# Patient Record
Sex: FEMALE | Race: WHITE | NOT HISPANIC OR LATINO | Employment: OTHER | URBAN - NONMETROPOLITAN AREA
[De-identification: names, ages, dates, MRNs, and addresses within clinical notes are randomized per-mention and may not be internally consistent; named-entity substitution may affect disease eponyms.]

---

## 2017-05-01 ENCOUNTER — OFFICE VISIT (OUTPATIENT)
Dept: CARDIOLOGY | Facility: CLINIC | Age: 72
End: 2017-05-01

## 2017-05-01 VITALS
HEART RATE: 76 BPM | HEIGHT: 64 IN | WEIGHT: 161 LBS | SYSTOLIC BLOOD PRESSURE: 120 MMHG | BODY MASS INDEX: 27.49 KG/M2 | DIASTOLIC BLOOD PRESSURE: 72 MMHG

## 2017-05-01 DIAGNOSIS — E78.00 HYPERCHOLESTEREMIA: ICD-10-CM

## 2017-05-01 DIAGNOSIS — R00.2 PALPITATIONS: ICD-10-CM

## 2017-05-01 DIAGNOSIS — G47.30 SLEEP APNEA IN ADULT: ICD-10-CM

## 2017-05-01 DIAGNOSIS — I10 ESSENTIAL HYPERTENSION: Primary | ICD-10-CM

## 2017-05-01 PROCEDURE — 99213 OFFICE O/P EST LOW 20 MIN: CPT | Performed by: NURSE PRACTITIONER

## 2017-05-01 RX ORDER — AMLODIPINE BESYLATE 2.5 MG/1
2.5 TABLET ORAL DAILY
COMMUNITY
End: 2017-05-01

## 2017-05-01 RX ORDER — CHOLECALCIFEROL (VITAMIN D3) 50 MCG
2000 TABLET ORAL DAILY
COMMUNITY

## 2017-11-02 ENCOUNTER — OFFICE VISIT (OUTPATIENT)
Dept: CARDIOLOGY | Facility: CLINIC | Age: 72
End: 2017-11-02

## 2017-11-02 VITALS
BODY MASS INDEX: 26.46 KG/M2 | HEART RATE: 72 BPM | SYSTOLIC BLOOD PRESSURE: 130 MMHG | DIASTOLIC BLOOD PRESSURE: 80 MMHG | WEIGHT: 155 LBS | HEIGHT: 64 IN

## 2017-11-02 DIAGNOSIS — E78.00 HYPERCHOLESTEREMIA: ICD-10-CM

## 2017-11-02 DIAGNOSIS — I10 ESSENTIAL HYPERTENSION: Primary | ICD-10-CM

## 2017-11-02 DIAGNOSIS — R00.2 PALPITATIONS: ICD-10-CM

## 2017-11-02 PROCEDURE — 99213 OFFICE O/P EST LOW 20 MIN: CPT | Performed by: NURSE PRACTITIONER

## 2017-11-02 NOTE — PROGRESS NOTES
Chief Complaint   Patient presents with   • Follow-up     For cardiac management. Last lab work on 05/02/17 per PCP, in chart.    • Med Refill     PCP does medication refills.        Cardiac Complaints  none      Subjective   Sheryl Bryan is a 72 y.o. female  history of normal coronaries by cardiac catheterization at  about 9 years ago.  At that time she underwent an esophageal surgery, and developed heart failure with normal systolic function.  Cardiac catheterization was reported as normal according to the patient.  She has since been treated with a combination of beta blockers and ARBs. At her last office visit recommended increasing beta blocker and stopping ARB.  She returns today for follow up and denies any cardiac concerns.  She denies chest pain, shortness of breath, or palpitations.  Patient states her coreg has kept palpitations well controlled.  Labs she reports with PCP and states most recent were done in May, she reports everything looked okay.  No refills needed as they are done with PCP.      Cardiac History  Past Surgical History:   Procedure Laterality Date   • CARDIOVASCULAR STRESS TEST  03/31/2015    4 Min, 82% THR. BP-- 180/80. Negative   • ECHO - CONVERTED  12/23/2014    ([facility]) EF 65%. RVSP 48 mmHg       Current Outpatient Prescriptions   Medication Sig Dispense Refill   • carvedilol (COREG) 12.5 MG tablet Take 1 tablet by mouth 2 (Two) Times a Day. 180 tablet 3   • Cholecalciferol (VITAMIN D) 2000 UNITS tablet Take 2,000 Units by mouth 2 (Two) Times a Day.     • doxepin (SINEquan) 75 MG capsule Take 75 mg by mouth Every Night.     • furosemide (LASIX) 40 MG tablet Daily.     • pantoprazole (PROTONIX) 40 MG EC tablet Take 40 mg by mouth Daily.     • PARoxetine (PAXIL) 20 MG tablet Take 20 mg by mouth Every Morning.     • potassium chloride (K-DUR,KLOR-CON) 20 MEQ CR tablet Take 20 mEq by mouth Daily.     • tiZANidine (ZANAFLEX) 4 MG tablet Take 4 mg by mouth At Night As Needed for muscle  "spasms.       No current facility-administered medications for this visit.        Penicillins    Past Medical History:   Diagnosis Date   • GERD (gastroesophageal reflux disease)    • H/O colonoscopy    • History of appendectomy    • Hypertension    • Sleep apnea        Social History     Social History   • Marital status:      Spouse name: N/A   • Number of children: N/A   • Years of education: N/A     Occupational History   • Not on file.     Social History Main Topics   • Smoking status: Former Smoker     Quit date: 1980   • Smokeless tobacco: Never Used   • Alcohol use Yes      Comment: socially- occasional   • Drug use: No   • Sexual activity: Not on file     Other Topics Concern   • Not on file     Social History Narrative       Family History   Problem Relation Age of Onset   • Cancer Mother      leukemia   • Aneurysm Father        Review of Systems   Constitution: Negative for weakness and malaise/fatigue.   Cardiovascular: Negative for chest pain, dyspnea on exertion, near-syncope and syncope.   Respiratory: Negative for shortness of breath and wheezing.    Musculoskeletal: Negative for arthritis and back pain.   Gastrointestinal: Negative for anorexia, heartburn and nausea.   Neurological: Negative for dizziness, light-headedness and loss of balance.   Psychiatric/Behavioral: Negative for altered mental status and depression.       DiabetesNo  Thyroidnormal    Objective     /80 (BP Location: Left arm)  Pulse 72  Ht 64\" (162.6 cm)  Wt 155 lb (70.3 kg)  BMI 26.61 kg/m2    Physical Exam   Constitutional: She is oriented to person, place, and time. She appears well-developed and well-nourished.   HENT:   Head: Normocephalic and atraumatic.   Eyes: EOM are normal. Pupils are equal, round, and reactive to light.   Neck: Normal range of motion. Neck supple.   Cardiovascular: Normal rate and regular rhythm.    Pulmonary/Chest: Effort normal and breath sounds normal.   Abdominal: Soft. "   Musculoskeletal: Normal range of motion.   Neurological: She is alert and oriented to person, place, and time.   Skin: Skin is warm and dry.   Psychiatric: She has a normal mood and affect. Her behavior is normal.       Procedures    Assessment/Plan     HR and BP are both stable today.  No changes in current regimen will be advised.  She has done much better with medication changes with stopping ARB and increasing BB.  Labs are done with your office, could I have most recent copy for our records?  No refills are needed today as she reports they are done with your office as well.  No new cardiac testing advised today as no new concerns are voiced.  We will consider repeat workup at next visit since length of time since last testing and past history of  LV dysfunction.  Good cardiac diet and activity as tolerated advised.  She has lost about 6 pounds since last visit trying to watch her diet and exercising.  6 month follow up advised with patient advised to call with concerns for sooner appointment.        Problems Addressed this Visit        Cardiovascular and Mediastinum    Essential hypertension - Primary    Hypercholesteremia    Palpitations                  Electronically signed by CHRISS Arce November 2, 2017 4:53 PM

## 2019-03-11 ENCOUNTER — TELEPHONE (OUTPATIENT)
Dept: CARDIOLOGY | Facility: CLINIC | Age: 74
End: 2019-03-11

## 2019-03-11 NOTE — TELEPHONE ENCOUNTER
Received fax from Dr. Eugene for cardiac clearance for patient to have an excision of right buttock cyst. Patient is on aspirin. According to our records, I do not see where patient has had any stenting. Patient was last seen on 11/02/17.      Fax [phone]  Phone [phone]

## 2019-09-09 ENCOUNTER — OFFICE VISIT (OUTPATIENT)
Dept: CARDIOLOGY | Facility: CLINIC | Age: 74
End: 2019-09-09

## 2019-09-09 VITALS
DIASTOLIC BLOOD PRESSURE: 84 MMHG | HEIGHT: 64 IN | SYSTOLIC BLOOD PRESSURE: 132 MMHG | WEIGHT: 156 LBS | HEART RATE: 75 BPM | BODY MASS INDEX: 26.63 KG/M2

## 2019-09-09 DIAGNOSIS — R00.2 PALPITATIONS: ICD-10-CM

## 2019-09-09 DIAGNOSIS — I10 ESSENTIAL HYPERTENSION: ICD-10-CM

## 2019-09-09 DIAGNOSIS — M25.561 CHRONIC PAIN OF RIGHT KNEE: ICD-10-CM

## 2019-09-09 DIAGNOSIS — E66.3 OVERWEIGHT: ICD-10-CM

## 2019-09-09 DIAGNOSIS — R01.1 HEART MURMUR: Primary | ICD-10-CM

## 2019-09-09 DIAGNOSIS — G47.33 OBSTRUCTIVE SLEEP APNEA SYNDROME: ICD-10-CM

## 2019-09-09 DIAGNOSIS — G89.29 CHRONIC PAIN OF RIGHT KNEE: ICD-10-CM

## 2019-09-09 DIAGNOSIS — R94.31 ABNORMAL EKG: ICD-10-CM

## 2019-09-09 DIAGNOSIS — I27.20 PULMONARY HTN (HCC): ICD-10-CM

## 2019-09-09 DIAGNOSIS — E78.00 HYPERCHOLESTEREMIA: ICD-10-CM

## 2019-09-09 PROCEDURE — 93000 ELECTROCARDIOGRAM COMPLETE: CPT | Performed by: NURSE PRACTITIONER

## 2019-09-09 PROCEDURE — 99214 OFFICE O/P EST MOD 30 MIN: CPT | Performed by: NURSE PRACTITIONER

## 2019-09-09 RX ORDER — ASPIRIN 81 MG/1
81 TABLET ORAL
COMMUNITY

## 2019-09-09 NOTE — PROGRESS NOTES
Chief Complaint   Patient presents with   • Follow-up     For cardiac management. Patient takes aspirin every 3 days. Patient states that she will be having knee replacement surgery. Last lab work was done on 06/26/19 per PCP, in chart under media with scan on 07/02/19 of office note from PCP. EKG done due to not having one in over 3 years and she states HR sometimes is in the 90s.    • Med Refill     PCP does medication refills. Brought medication list.        Cardiac Complaints  palpitations      Subjective   Sheryl LAURENT Bryan is a 74 y.o. female with normal coronaries by cardiac catheterization at , HTN, hyperlipidemia, palpitations,  pulmonary HTN, and sleep apnea.  At that time she underwent an esophageal surgery, and developed heart failure with normal systolic function.  Cardiac catheterization was reported as normal according to the patient.  She has since been treated with a combination of beta blockers and ARBs. At her last office visit recommended increasing beta blocker and stopping ARB.      She returns today for follow up and denies any cardiac concerns.  She denies chest pain, shortness of breath, or palpitations.  Patient states her coreg has kept palpitations well controlled. She denies wearing CPAP for sleep apnea management. Labs she reports with PCP and states most recent were done in May, she reports everything looked okay.  No refills needed as they are done with PCP.  Most recent labs from June 2019 show:  H/H 14.1/40.7, TRIG 184, HDL 49, and .  No refills needed as she reports with PCP.  She does admit to upcoming knee surgery with Dr. Rowan that is scheduled for sometime after the first of the year, she is requesting clearance in regards.            Cardiac History  Past Surgical History:   Procedure Laterality Date   • CARDIOVASCULAR STRESS TEST  03/31/2015    4 Min, 82% THR. BP-- 180/80. Negative   • ECHO - CONVERTED  12/23/2014    ([facility]) EF 65%. RVSP 48 mmHg       Current  Outpatient Medications   Medication Sig Dispense Refill   • aspirin 81 MG EC tablet Take 81 mg by mouth Every 3 (Three) Days.     • carvedilol (COREG) 12.5 MG tablet Take 1 tablet by mouth 2 (Two) Times a Day. 180 tablet 3   • Cholecalciferol (VITAMIN D) 2000 UNITS tablet Take 2,000 Units by mouth Daily.     • doxepin (SINEquan) 75 MG capsule Take 75 mg by mouth Every Night.     • furosemide (LASIX) 40 MG tablet Take 40 mg by mouth Daily.     • pantoprazole (PROTONIX) 40 MG EC tablet Take 40 mg by mouth Daily.     • PARoxetine (PAXIL) 20 MG tablet Take 20 mg by mouth Every Morning.     • potassium chloride (K-DUR,KLOR-CON) 20 MEQ CR tablet Take 20 mEq by mouth Daily.     • tiZANidine (ZANAFLEX) 4 MG tablet Take 4 mg by mouth At Night As Needed for muscle spasms.       No current facility-administered medications for this visit.        Penicillins    Past Medical History:   Diagnosis Date   • GERD (gastroesophageal reflux disease)    • H/O colonoscopy    • History of appendectomy    • Hypertension    • Sleep apnea        Social History     Socioeconomic History   • Marital status:      Spouse name: Not on file   • Number of children: Not on file   • Years of education: Not on file   • Highest education level: Not on file   Tobacco Use   • Smoking status: Former Smoker     Last attempt to quit: 1980     Years since quittin.7   • Smokeless tobacco: Never Used   Substance and Sexual Activity   • Alcohol use: Yes     Comment: socially- occasional   • Drug use: No       Family History   Problem Relation Age of Onset   • Cancer Mother         leukemia   • Aneurysm Father        Review of Systems   Constitution: Negative for weakness and malaise/fatigue.   Cardiovascular: Positive for palpitations. Negative for chest pain, claudication, dyspnea on exertion, irregular heartbeat, leg swelling, near-syncope and syncope.   Respiratory: Negative for cough, shortness of breath and wheezing.    Musculoskeletal:  "Positive for joint pain and stiffness.   Gastrointestinal: Negative for anorexia, flatus, melena, nausea and vomiting.   Genitourinary: Negative for dysuria, hematuria, hesitancy and nocturia.   Neurological: Negative for dizziness, light-headedness and loss of balance.   Psychiatric/Behavioral: Negative for depression and memory loss. The patient is not nervous/anxious.            Objective     /84 (BP Location: Left arm)   Pulse 75   Ht 162.6 cm (64.02\")   Wt 70.8 kg (156 lb)   BMI 26.76 kg/m²     Physical Exam   Constitutional: She is oriented to person, place, and time. She appears well-developed and well-nourished.   HENT:   Head: Normocephalic and atraumatic.   Eyes: EOM are normal. Pupils are equal, round, and reactive to light.   Neck: Normal range of motion. Neck supple.   Cardiovascular: Normal rate and regular rhythm.   Murmur heard.  Pulmonary/Chest: Effort normal and breath sounds normal.   Abdominal: Soft.   Musculoskeletal: Normal range of motion.   Neurological: She is alert and oriented to person, place, and time.   Skin: Skin is warm and dry.   Psychiatric: She has a normal mood and affect. Her behavior is normal.         ECG 12 Lead  Date/Time: 9/9/2019 10:40 AM  Performed by: Yoanna Shukla APRN  Authorized by: Yoanna Shukla APRN   Comparison: compared with previous ECG from 3/24/2015  Similar to previous ECG  Rhythm: sinus rhythm  BPM: 75    Clinical impression: abnormal EKG            Assessment/Plan     HR is stable today and regular rhythm noted. HTN well managed on current, same continued. EKG done today for history of palpitations shows a NSR and possible old infarct similar to prior EKG in 2015.  Echo will be advised today to assess for any worsening pulmonary HTN, WMA, or LV dysfunction as she does have history of pulmonary HTN and LV dysfunction in the past. More recommendations to follow. She denies compliance with current CPAP therapy for sleep apnea. Patient urged to " see pulmonary in regards. Palpitations remain well controlled on current beta blocker therapy, same advised. Limited caffeine intake recommended. She does have knee pain and admits she will be having upcoming knee surgery. If clearance should be needed, form to be filled out. Most recent labs show LDL elevated over 100, she does not wish to start statin therapy at this time. She will be urged to limit carbs, sugars, and starches and to walk more once her knee is repaired as well as add krill oil OTC in regards. BMI noted at 26.67 similar to prior. Patient advised on good cardiac diet with limited carbs, calories, and increased activity.  6 month follow up recommended or sooner if needed.         Problems Addressed this Visit        Cardiovascular and Mediastinum    Essential hypertension    Hypercholesteremia    Palpitations    Relevant Orders    ECG 12 Lead      Other Visit Diagnoses     Heart murmur    -  Primary    Relevant Orders    Adult Transthoracic Echo Complete W/ Cont if Necessary Per Protocol    Pulmonary HTN (CMS/HCC)        Relevant Orders    Adult Transthoracic Echo Complete W/ Cont if Necessary Per Protocol    Abnormal EKG        Relevant Orders    Adult Transthoracic Echo Complete W/ Cont if Necessary Per Protocol    Chronic pain of right knee        Obstructive sleep apnea syndrome        Overweight              Patient's Body mass index is 26.76 kg/m². BMI is above normal parameters. Recommendations include: nutrition counseling.                  Electronically signed by CHRISS Arce September 9, 2019 4:23 PM

## 2019-09-17 ENCOUNTER — APPOINTMENT (OUTPATIENT)
Dept: CARDIOLOGY | Facility: HOSPITAL | Age: 74
End: 2019-09-17

## 2019-09-25 ENCOUNTER — APPOINTMENT (OUTPATIENT)
Dept: CARDIOLOGY | Facility: HOSPITAL | Age: 74
End: 2019-09-25

## 2020-01-14 ENCOUNTER — HOSPITAL ENCOUNTER (OUTPATIENT)
Dept: CARDIOLOGY | Facility: HOSPITAL | Age: 75
Discharge: HOME | End: 2020-01-14
Admitting: NURSE PRACTITIONER

## 2020-01-14 VITALS — BODY MASS INDEX: 26.65 KG/M2 | WEIGHT: 156.09 LBS | HEIGHT: 64 IN

## 2020-01-14 DIAGNOSIS — R94.31 ABNORMAL EKG: ICD-10-CM

## 2020-01-14 DIAGNOSIS — I27.20 PULMONARY HTN (HCC): ICD-10-CM

## 2020-01-14 DIAGNOSIS — R01.1 HEART MURMUR: ICD-10-CM

## 2020-01-14 LAB
AORTIC DIMENSIONLESS INDEX: 1 (DI)
BH CV ECHO MEAS - ACS: 1.6 CM
BH CV ECHO MEAS - AI DEC SLOPE: 171 CM/SEC^2
BH CV ECHO MEAS - AI MAX PG: 26.2 MMHG
BH CV ECHO MEAS - AI MAX VEL: 256 CM/SEC
BH CV ECHO MEAS - AI P1/2T: 438.5 MSEC
BH CV ECHO MEAS - AO MAX PG (FULL): -0.29 MMHG
BH CV ECHO MEAS - AO MAX PG: 5.6 MMHG
BH CV ECHO MEAS - AO MEAN PG (FULL): -1 MMHG
BH CV ECHO MEAS - AO MEAN PG: 2 MMHG
BH CV ECHO MEAS - AO ROOT AREA (BSA CORRECTED): 1.6
BH CV ECHO MEAS - AO ROOT AREA: 6.2 CM^2
BH CV ECHO MEAS - AO ROOT DIAM: 2.8 CM
BH CV ECHO MEAS - AO V2 MAX: 118 CM/SEC
BH CV ECHO MEAS - AO V2 MEAN: 70.8 CM/SEC
BH CV ECHO MEAS - AO V2 VTI: 25.7 CM
BH CV ECHO MEAS - BSA(HAYCOCK): 1.8 M^2
BH CV ECHO MEAS - BSA: 1.8 M^2
BH CV ECHO MEAS - BZI_BMI: 26.8 KILOGRAMS/M^2
BH CV ECHO MEAS - BZI_METRIC_HEIGHT: 162.6 CM
BH CV ECHO MEAS - BZI_METRIC_WEIGHT: 70.8 KG
BH CV ECHO MEAS - EDV(CUBED): 97.3 ML
BH CV ECHO MEAS - EDV(TEICH): 97.3 ML
BH CV ECHO MEAS - EF(CUBED): 78.9 %
BH CV ECHO MEAS - EF(TEICH): 71.2 %
BH CV ECHO MEAS - ESV(CUBED): 20.6 ML
BH CV ECHO MEAS - ESV(TEICH): 28 ML
BH CV ECHO MEAS - FS: 40.4 %
BH CV ECHO MEAS - IVS/LVPW: 1.2
BH CV ECHO MEAS - IVSD: 1.2 CM
BH CV ECHO MEAS - LA DIMENSION(2D): 3.7 CM
BH CV ECHO MEAS - LA DIMENSION: 4.1 CM
BH CV ECHO MEAS - LA/AO: 1.5
BH CV ECHO MEAS - LAT PEAK E' VEL: 6.5 CM/SEC
BH CV ECHO MEAS - LV IVRT: 0.11 SEC
BH CV ECHO MEAS - LV MASS(C)D: 173.2 GRAMS
BH CV ECHO MEAS - LV MASS(C)DI: 98.4 GRAMS/M^2
BH CV ECHO MEAS - LV MAX PG: 5.9 MMHG
BH CV ECHO MEAS - LV MEAN PG: 3 MMHG
BH CV ECHO MEAS - LV V1 MAX: 121 CM/SEC
BH CV ECHO MEAS - LV V1 MEAN: 73.1 CM/SEC
BH CV ECHO MEAS - LV V1 VTI: 29.6 CM
BH CV ECHO MEAS - LVIDD: 4.6 CM
BH CV ECHO MEAS - LVIDS: 2.7 CM
BH CV ECHO MEAS - LVPWD: 0.98 CM
BH CV ECHO MEAS - MED PEAK E' VEL: 5.7 CM/SEC
BH CV ECHO MEAS - MR MAX PG: 48.2 MMHG
BH CV ECHO MEAS - MR MAX VEL: 347 CM/SEC
BH CV ECHO MEAS - MV A MAX VEL: 122 CM/SEC
BH CV ECHO MEAS - MV DEC SLOPE: 501 CM/SEC^2
BH CV ECHO MEAS - MV DEC TIME: 0.29 SEC
BH CV ECHO MEAS - MV E MAX VEL: 93.9 CM/SEC
BH CV ECHO MEAS - MV E/A: 0.77
BH CV ECHO MEAS - MV MAX PG: 6.9 MMHG
BH CV ECHO MEAS - MV MEAN PG: 3 MMHG
BH CV ECHO MEAS - MV P1/2T MAX VEL: 89.8 CM/SEC
BH CV ECHO MEAS - MV P1/2T: 52.5 MSEC
BH CV ECHO MEAS - MV V2 MAX: 131 CM/SEC
BH CV ECHO MEAS - MV V2 MEAN: 74.2 CM/SEC
BH CV ECHO MEAS - MV V2 VTI: 31.1 CM
BH CV ECHO MEAS - MVA P1/2T LCG: 2.4 CM^2
BH CV ECHO MEAS - MVA(P1/2T): 4.2 CM^2
BH CV ECHO MEAS - PA MAX PG (FULL): 1.7 MMHG
BH CV ECHO MEAS - PA MAX PG: 4 MMHG
BH CV ECHO MEAS - PA MEAN PG (FULL): 1 MMHG
BH CV ECHO MEAS - PA MEAN PG: 2 MMHG
BH CV ECHO MEAS - PA V2 MAX: 100 CM/SEC
BH CV ECHO MEAS - PA V2 MEAN: 68.5 CM/SEC
BH CV ECHO MEAS - PA V2 VTI: 19.4 CM
BH CV ECHO MEAS - RAP SYSTOLE: 10 MMHG
BH CV ECHO MEAS - RV MAX PG: 2.3 MMHG
BH CV ECHO MEAS - RV MEAN PG: 1 MMHG
BH CV ECHO MEAS - RV V1 MAX: 75.7 CM/SEC
BH CV ECHO MEAS - RV V1 MEAN: 53.9 CM/SEC
BH CV ECHO MEAS - RV V1 VTI: 17.5 CM
BH CV ECHO MEAS - RVDD: 2.6 CM
BH CV ECHO MEAS - RVSP: 30 MMHG
BH CV ECHO MEAS - SI(AO): 89.9 ML/M^2
BH CV ECHO MEAS - SI(CUBED): 43.6 ML/M^2
BH CV ECHO MEAS - SI(TEICH): 39.4 ML/M^2
BH CV ECHO MEAS - SV(AO): 158.2 ML
BH CV ECHO MEAS - SV(CUBED): 76.8 ML
BH CV ECHO MEAS - SV(TEICH): 69.3 ML
BH CV ECHO MEAS - TR MAX VEL: 223 CM/SEC
BH CV ECHO MEASUREMENTS AVERAGE E/E' RATIO: 15.39
MAXIMAL PREDICTED HEART RATE: 145 BPM
STRESS TARGET HR: 123 BPM

## 2020-01-14 PROCEDURE — 93306 TTE W/DOPPLER COMPLETE: CPT

## 2020-01-14 PROCEDURE — 93306 TTE W/DOPPLER COMPLETE: CPT | Performed by: INTERNAL MEDICINE

## 2020-01-15 ENCOUNTER — TELEPHONE (OUTPATIENT)
Dept: CARDIOLOGY | Facility: CLINIC | Age: 75
End: 2020-01-15

## 2020-01-15 NOTE — TELEPHONE ENCOUNTER
Received fax from Dr. Ward for cardiac clearance for patient to have a gastrocnemius recession surgery. Patient is on aspirin. According to our records, I do not see where patient has had any stenting.       Fax [phone]    Attn: Maegan Carmona

## 2021-02-01 ENCOUNTER — IMMUNIZATION (OUTPATIENT)
Dept: VACCINE CLINIC | Facility: HOSPITAL | Age: 76
End: 2021-02-01

## 2021-02-01 PROCEDURE — 0001A: CPT | Performed by: FAMILY MEDICINE

## 2021-02-01 PROCEDURE — 91300 HC SARSCOV02 VAC 30MCG/0.3ML IM: CPT | Performed by: FAMILY MEDICINE

## 2021-02-22 ENCOUNTER — IMMUNIZATION (OUTPATIENT)
Dept: VACCINE CLINIC | Facility: HOSPITAL | Age: 76
End: 2021-02-22

## 2021-02-22 PROCEDURE — 0002A: CPT | Performed by: INTERNAL MEDICINE

## 2021-02-22 PROCEDURE — 91300 HC SARSCOV02 VAC 30MCG/0.3ML IM: CPT | Performed by: INTERNAL MEDICINE

## 2023-08-15 ENCOUNTER — OFFICE VISIT (OUTPATIENT)
Dept: CARDIOLOGY | Facility: CLINIC | Age: 78
End: 2023-08-15
Payer: MEDICARE

## 2023-08-15 VITALS
HEART RATE: 72 BPM | WEIGHT: 154 LBS | BODY MASS INDEX: 26.29 KG/M2 | DIASTOLIC BLOOD PRESSURE: 80 MMHG | SYSTOLIC BLOOD PRESSURE: 156 MMHG | HEIGHT: 64 IN

## 2023-08-15 DIAGNOSIS — R00.2 PALPITATIONS: ICD-10-CM

## 2023-08-15 DIAGNOSIS — I44.7 LBBB (LEFT BUNDLE BRANCH BLOCK): ICD-10-CM

## 2023-08-15 DIAGNOSIS — G47.30 SLEEP APNEA IN ADULT: ICD-10-CM

## 2023-08-15 DIAGNOSIS — I10 ESSENTIAL HYPERTENSION: ICD-10-CM

## 2023-08-15 DIAGNOSIS — R42 DIZZINESS: ICD-10-CM

## 2023-08-15 DIAGNOSIS — I25.6 SILENT MYOCARDIAL ISCHEMIA: ICD-10-CM

## 2023-08-15 DIAGNOSIS — R73.9 HYPERGLYCEMIA: ICD-10-CM

## 2023-08-15 DIAGNOSIS — R06.02 SHORTNESS OF BREATH: Primary | ICD-10-CM

## 2023-08-15 DIAGNOSIS — R53.82 CHRONIC FATIGUE: ICD-10-CM

## 2023-08-15 DIAGNOSIS — E78.00 HYPERCHOLESTEREMIA: ICD-10-CM

## 2023-08-15 DIAGNOSIS — R01.1 HEART MURMUR: ICD-10-CM

## 2023-08-15 DIAGNOSIS — R20.0 NUMBNESS: ICD-10-CM

## 2023-08-15 PROCEDURE — 3077F SYST BP >= 140 MM HG: CPT | Performed by: INTERNAL MEDICINE

## 2023-08-15 PROCEDURE — 1160F RVW MEDS BY RX/DR IN RCRD: CPT | Performed by: INTERNAL MEDICINE

## 2023-08-15 PROCEDURE — 93000 ELECTROCARDIOGRAM COMPLETE: CPT | Performed by: INTERNAL MEDICINE

## 2023-08-15 PROCEDURE — 1159F MED LIST DOCD IN RCRD: CPT | Performed by: INTERNAL MEDICINE

## 2023-08-15 PROCEDURE — 3079F DIAST BP 80-89 MM HG: CPT | Performed by: INTERNAL MEDICINE

## 2023-08-15 PROCEDURE — 99204 OFFICE O/P NEW MOD 45 MIN: CPT | Performed by: INTERNAL MEDICINE

## 2023-08-15 RX ORDER — CARVEDILOL 12.5 MG/1
12.5 TABLET ORAL 2 TIMES DAILY WITH MEALS
COMMUNITY

## 2023-08-15 RX ORDER — MONTELUKAST SODIUM 10 MG/1
10 TABLET ORAL NIGHTLY
COMMUNITY

## 2023-08-15 RX ORDER — NAPROXEN SODIUM 220 MG
220 TABLET ORAL
COMMUNITY

## 2023-08-15 RX ORDER — CHOLECALCIFEROL (VITAMIN D3) 125 MCG
500 CAPSULE ORAL DAILY
COMMUNITY

## 2023-08-15 RX ORDER — LOSARTAN POTASSIUM 50 MG/1
50 TABLET ORAL DAILY
Qty: 30 TABLET | Refills: 6 | Status: SHIPPED | OUTPATIENT
Start: 2023-08-15

## 2023-08-15 NOTE — LETTER
August 15, 2023       No Recipients    Patient: Sheryl Bryan   YOB: 1945   Date of Visit: 8/15/2023     Dear Rodney Swift MD:       Thank you for referring Sheryl Bryan to me for evaluation. Below are the relevant portions of my assessment and plan of care.    If you have questions, please do not hesitate to call me. I look forward to following Sheryl along with you.         Sincerely,        Jamison Judge MD        CC:   No Recipients    Jamison Judge MD  08/15/23 1333  Sign when Signing Visit  Chief Complaint   Patient presents with    Establish Care     Self referred for shortness of breath and swelling. Last saw us in 2019, has not had any cardiac testing since then, everything is in chart. Last testing was an echo in 2020, in chart under cardiology. Did have some labs done with orthopaedics, results are in chart under labs from 06/15/23. Will attempt to obtain labs from PCP.     Shortness of Breath     States that shortness of breath has been happening for about a year with exertion. Takes about 5 minutes for her to get breath again.    Leg Swelling     States that she has noticed swelling off and on. States that she did have to have reconstructive surgery on left ankle, but has noticed swelling in both ankles. States that when she notices that they are swollen she will take an extra lasix.    Numbness     States that she does have some numbness in tingling in her arms at times.     Sleep apnea     Does sleep with CPAP and it has O2 at night.        CARDIAC COMPLAINTS  dyspnea, fatigue, and lower extremity edema      Subjective  Sheryl Bryan is a 78 y.o. female came in today as a self-referral.  She was last seen here more than 3 years ago.  She has history of hypertension was initially seen for shortness of breath.  She was diagnosed with congestive heart failure in the past.  She underwent esophageal surgery at  and went into pulmonary congestion and found to have a heart failure  with normal systolic function.  She underwent cardiac catheterization at  and according to her it was normal.  She was then seen by another cardiologist and later with us.  Her echocardiogram which was done in 2019 showed LVH with good LV function mild left atrial enlargement mild MR and AI and no significant pulmonary hypertension.  Her EKG at that time was unremarkable.  She also has dyslipidemia but was not on any statin at that time.  She was scheduled to undergo surgery on knee as well as leg.  Cardiac clearance was given.  She now wants to be reevaluated.  For about a year she has noticed increasing shortness of breath.  It occurs mostly on exertion and when it starts she has to take at least 5 minutes of rest before she feels better.  She also noticed that she has bilateral leg swelling mostly around the ankles.  She take extra dose of Lasix.  She also started noticing tightness in the chest and also numbness in the arm.  The tightness occurs when she is very short winded.  She was diagnosed with sleep apnea and uses CPAP and now has oxygen also along with the CPAP.  She is not sure when was the last time she had labs done.  She quit smoking in .  Her mother  at the age of 89 with cancer and her father  at the age of 69 with aneurysm.    Past Surgical History:   Procedure Laterality Date    CARDIOVASCULAR STRESS TEST  2015    4 Min, 82% THR. BP-- 180/80. Negative    ECHO - CONVERTED  2014    ([facility]) EF 65%. RVSP 48 mmHg    ECHO - CONVERTED  2020    EF 65%. LA- 4.1 Cm. Mild MR & AI. RVSP- 30 mmHg.       Current Outpatient Medications   Medication Sig Dispense Refill    carvedilol (COREG) 12.5 MG tablet Take 1 tablet by mouth 2 (Two) Times a Day With Meals.      Cholecalciferol (VITAMIN D) 2000 UNITS tablet Take 1 tablet by mouth Daily.      doxepin (SINEquan) 75 MG capsule Take 1 capsule by mouth Every Night.      furosemide (LASIX) 40 MG tablet Take 1 tablet by mouth 2  (Two) Times a Day As Needed.      montelukast (SINGULAIR) 10 MG tablet Take 1 tablet by mouth Every Night.      naproxen sodium (ALEVE) 220 MG tablet Take 1 tablet by mouth every night at bedtime.      pantoprazole (PROTONIX) 40 MG EC tablet Take 1 tablet by mouth Daily.      potassium chloride (K-DUR,KLOR-CON) 20 MEQ CR tablet Take 1 tablet by mouth Daily.      Probiotic Product (PROBIOTIC DAILY PO) Take 100 mg by mouth 2 (Two) Times a Day.      tiZANidine (ZANAFLEX) 4 MG tablet Take 1 tablet by mouth At Night As Needed for Muscle Spasms.      vitamin B-12 (CYANOCOBALAMIN) 500 MCG tablet Take 1 tablet by mouth Daily.      aspirin 81 MG EC tablet Take 1 tablet by mouth Every 3 (Three) Days.      losartan (Cozaar) 50 MG tablet Take 1 tablet by mouth Daily. 30 tablet 6    PARoxetine (PAXIL) 20 MG tablet Take 1 tablet by mouth Every Morning.       No current facility-administered medications for this visit.           ALLERGIES:  Penicillins    Past Medical History:   Diagnosis Date    GERD (gastroesophageal reflux disease)     H/O colonoscopy     History of appendectomy     Hypertension     Sleep apnea        Social History     Tobacco Use   Smoking Status Former    Types: Cigarettes    Quit date:     Years since quittin.6   Smokeless Tobacco Never          Family History   Problem Relation Age of Onset    Cancer Mother         leukemia    Aneurysm Father     Stroke Maternal Aunt     Diabetes Maternal Grandmother        Review of Systems   Constitutional: Positive for malaise/fatigue. Negative for decreased appetite.   HENT:  Negative for congestion and sore throat.    Eyes:  Negative for blurred vision, double vision and visual disturbance.   Cardiovascular:  Positive for dyspnea on exertion and palpitations. Negative for chest pain.   Respiratory:  Positive for shortness of breath. Negative for snoring.    Endocrine: Negative for cold intolerance and heat intolerance.  "  Hematologic/Lymphatic: Negative for adenopathy. Does not bruise/bleed easily.   Skin:  Negative for itching, nail changes and skin cancer.   Musculoskeletal:  Negative for arthritis and myalgias.   Gastrointestinal:  Negative for abdominal pain, dysphagia and heartburn.   Genitourinary:  Negative for bladder incontinence and frequency.   Neurological:  Positive for numbness. Negative for dizziness, seizures and vertigo.   Psychiatric/Behavioral:  Negative for altered mental status.    Allergic/Immunologic: Negative for environmental allergies and hives.     Diabetes- No  Thyroid- normal    Objective    /80 (BP Location: Left arm)   Pulse 72   Ht 162.6 cm (64\")   Wt 69.9 kg (154 lb)   BMI 26.43 kg/mý     Vitals and nursing note reviewed.   Constitutional:       Appearance: Healthy appearance. Not in distress.   Eyes:      Conjunctiva/sclera: Conjunctivae normal.      Pupils: Pupils are equal, round, and reactive to light.   HENT:      Head: Normocephalic.   Pulmonary:      Effort: Pulmonary effort is normal.      Breath sounds: Normal breath sounds.   Cardiovascular:      PMI at left midclavicular line. Normal rate. Regular rhythm.      Murmurs: There is a grade 3/6 harsh midsystolic murmur at the URSB, radiating to the neck. There is also a grade 4/6 high frequency blowing holosystolic murmur at the apex.   Abdominal:      General: Bowel sounds are normal.      Palpations: Abdomen is soft.   Musculoskeletal: Normal range of motion.      Cervical back: Normal range of motion and neck supple. Skin:     General: Skin is warm and dry.   Neurological:      Mental Status: Alert, oriented to person, place, and time and oriented to person, place and time.       ECG 12 Lead    Date/Time: 8/15/2023 1:32 PM  Performed by: Jamison Judge MD  Authorized by: Jamison Judge MD   Comparison: compared with previous ECG from 9/9/2019  Comparison to previous ECG: LBBB  Rhythm: sinus rhythm  Rate: " normal  Conduction: left bundle branch block and left anterior fascicular block  QRS axis: left    Clinical impression: abnormal EKG        @ASSESSMENT/PLAN@  BMI is >= 25 and <30. (Overweight) The following options were offered after discussion;: weight loss educational material (shared in after visit summary), exercise counseling/recommendations, and nutrition counseling/recommendations     Diagnoses and all orders for this visit:    1. Shortness of breath (Primary)  -     Adult Transthoracic Echo Complete W/ Cont if Necessary Per Protocol; Future  -     BNP; Future    2. Essential hypertension  -     losartan (Cozaar) 50 MG tablet; Take 1 tablet by mouth Daily.  Dispense: 30 tablet; Refill: 6  -     Comprehensive Metabolic Panel; Future    3. Hypercholesteremia  -     Lipid Panel; Future    4. Palpitations  -     TSH; Future    5. LBBB (left bundle branch block)  -     Stress Test With Myocardial Perfusion One Day; Future    6. Silent myocardial ischemia  -     Stress Test With Myocardial Perfusion One Day; Future    7. Chronic fatigue  -     CBC & Differential; Future  -     Vitamin B12; Future  -     Folate; Future    8. Sleep apnea in adult    9. Numbness  -     Vitamin B12; Future  -     Folate; Future  -     US Carotid Bilateral; Future    10. Hyperglycemia  -     Hemoglobin A1c; Future    11. Heart murmur    12. Dizziness  -     US Carotid Bilateral; Future    At baseline her heart rate is stable.  Her blood pressure is mildly elevated.  Her EKG showed sinus rhythm with left bundle branch block and left axis deviation.  Her clinical examination reveals a BMI of 26.  She does have 3/6 ejection systolic murmur at the aortic area and a 4/6 systolic murmur at the mitral area.  She has 1+ pedal edema.    Her predominant symptom is the shortness of breath which seems to be progressively getting worse.  Given the fact that she also has a new left bundle branch block, I advised her to undergo an echocardiogram to  evaluate the LV function, valvular structures as well as the PA pressure.    Regarding her hypertension, the blood pressure is still elevated.  I increased her dose of Cozaar to 50 mg once a day    Regarding her history of hypercholesterolemia, she is not on any statin at this time.  I like to recheck a lipid profile.    Regarding her history of intermittent palpitation, like to check a TSH and magnesium level    Regarding her left bundle branch block, it is completely new.  I could not find the EKG done in June at Dr. Perez's office but the report shows the QRS complex was normal.  I explained to her about the left bundle branch block and the possibility of dyssynergy and causing LV dysfunction.  We will review the echocardiogram when it is available.  Also explained to her about the possibility of silent myocardial ischemia.  I scheduled her to undergo a Lexiscan to rule it out    Regarding her chronic fatigue, it could be related to LV dysfunction but need to rule out other causes.  Advised her to check her blood count, B12 and folic acid level    Regarding his sleep apnea, she does use CPAP as well as oxygen    Regarding her numbness, need to rule out any neuropathy.  If those are normal she may also need to rule out any old stroke.  I scheduled her to undergo ultrasound of the carotid    Regarding his history of mild hyperglycemia in the past, advised her to check her A1c level    Regarding the heart murmur, it appears to be from mitral regurgitation and possible aortic sclerosis.  We will get an echocardiogram to evaluate the valvular structures    Regarding the dizziness, we will get a carotid ultrasound    Regarding the advanced directive, talked to her about living will and power of .  She apparently does have a living will but do not have a power of .    Based on the results of the stress and echo, further recommendations will be made               Electronically signed by Jamison  MD Alfie August 15, 2023 13:29 EDT

## 2023-08-15 NOTE — PROGRESS NOTES
Chief Complaint   Patient presents with    Establish Care     Self referred for shortness of breath and swelling. Last saw us in 2019, has not had any cardiac testing since then, everything is in chart. Last testing was an echo in 2020, in chart under cardiology. Did have some labs done with orthopaedics, results are in chart under labs from 06/15/23. Will attempt to obtain labs from PCP.     Shortness of Breath     States that shortness of breath has been happening for about a year with exertion. Takes about 5 minutes for her to get breath again.    Leg Swelling     States that she has noticed swelling off and on. States that she did have to have reconstructive surgery on left ankle, but has noticed swelling in both ankles. States that when she notices that they are swollen she will take an extra lasix.    Numbness     States that she does have some numbness in tingling in her arms at times.     Sleep apnea     Does sleep with CPAP and it has O2 at night.        CARDIAC COMPLAINTS  dyspnea, fatigue, and lower extremity edema      Subjective   Sheryl Bryan is a 78 y.o. female came in today as a self-referral.  She was last seen here more than 3 years ago.  She has history of hypertension was initially seen for shortness of breath.  She was diagnosed with congestive heart failure in the past.  She underwent esophageal surgery at  and went into pulmonary congestion and found to have a heart failure with normal systolic function.  She underwent cardiac catheterization at  and according to her it was normal.  She was then seen by another cardiologist and later with us.  Her echocardiogram which was done in 2019 showed LVH with good LV function mild left atrial enlargement mild MR and AI and no significant pulmonary hypertension.  Her EKG at that time was unremarkable.  She also has dyslipidemia but was not on any statin at that time.  She was scheduled to undergo surgery on knee as well as leg.  Cardiac clearance  was given.  She now wants to be reevaluated.  For about a year she has noticed increasing shortness of breath.  It occurs mostly on exertion and when it starts she has to take at least 5 minutes of rest before she feels better.  She also noticed that she has bilateral leg swelling mostly around the ankles.  She take extra dose of Lasix.  She also started noticing tightness in the chest and also numbness in the arm.  The tightness occurs when she is very short winded.  She was diagnosed with sleep apnea and uses CPAP and now has oxygen also along with the CPAP.  She is not sure when was the last time she had labs done.  She quit smoking in .  Her mother  at the age of 89 with cancer and her father  at the age of 69 with aneurysm.    Past Surgical History:   Procedure Laterality Date    CARDIOVASCULAR STRESS TEST  2015    4 Min, 82% THR. BP-- 180/80. Negative    ECHO - CONVERTED  2014    ([facility]) EF 65%. RVSP 48 mmHg    ECHO - CONVERTED  2020    EF 65%. LA- 4.1 Cm. Mild MR & AI. RVSP- 30 mmHg.       Current Outpatient Medications   Medication Sig Dispense Refill    carvedilol (COREG) 12.5 MG tablet Take 1 tablet by mouth 2 (Two) Times a Day With Meals.      Cholecalciferol (VITAMIN D) 2000 UNITS tablet Take 1 tablet by mouth Daily.      doxepin (SINEquan) 75 MG capsule Take 1 capsule by mouth Every Night.      furosemide (LASIX) 40 MG tablet Take 1 tablet by mouth 2 (Two) Times a Day As Needed.      montelukast (SINGULAIR) 10 MG tablet Take 1 tablet by mouth Every Night.      naproxen sodium (ALEVE) 220 MG tablet Take 1 tablet by mouth every night at bedtime.      pantoprazole (PROTONIX) 40 MG EC tablet Take 1 tablet by mouth Daily.      potassium chloride (K-DUR,KLOR-CON) 20 MEQ CR tablet Take 1 tablet by mouth Daily.      Probiotic Product (PROBIOTIC DAILY PO) Take 100 mg by mouth 2 (Two) Times a Day.      tiZANidine (ZANAFLEX) 4 MG tablet Take 1 tablet by mouth At Night As  Needed for Muscle Spasms.      vitamin B-12 (CYANOCOBALAMIN) 500 MCG tablet Take 1 tablet by mouth Daily.      aspirin 81 MG EC tablet Take 1 tablet by mouth Every 3 (Three) Days.      losartan (Cozaar) 50 MG tablet Take 1 tablet by mouth Daily. 30 tablet 6    PARoxetine (PAXIL) 20 MG tablet Take 1 tablet by mouth Every Morning.       No current facility-administered medications for this visit.           ALLERGIES:  Penicillins    Past Medical History:   Diagnosis Date    GERD (gastroesophageal reflux disease)     H/O colonoscopy     History of appendectomy     Hypertension     Sleep apnea        Social History     Tobacco Use   Smoking Status Former    Types: Cigarettes    Quit date:     Years since quittin.6   Smokeless Tobacco Never          Family History   Problem Relation Age of Onset    Cancer Mother         leukemia    Aneurysm Father     Stroke Maternal Aunt     Diabetes Maternal Grandmother        Review of Systems   Constitutional: Positive for malaise/fatigue. Negative for decreased appetite.   HENT:  Negative for congestion and sore throat.    Eyes:  Negative for blurred vision, double vision and visual disturbance.   Cardiovascular:  Positive for dyspnea on exertion and palpitations. Negative for chest pain.   Respiratory:  Positive for shortness of breath. Negative for snoring.    Endocrine: Negative for cold intolerance and heat intolerance.   Hematologic/Lymphatic: Negative for adenopathy. Does not bruise/bleed easily.   Skin:  Negative for itching, nail changes and skin cancer.   Musculoskeletal:  Negative for arthritis and myalgias.   Gastrointestinal:  Negative for abdominal pain, dysphagia and heartburn.   Genitourinary:  Negative for bladder incontinence and frequency.   Neurological:  Positive for numbness. Negative for dizziness, seizures and vertigo.   Psychiatric/Behavioral:  Negative for altered mental status.    Allergic/Immunologic: Negative for environmental  "allergies and hives.     Diabetes- No  Thyroid- normal    Objective     /80 (BP Location: Left arm)   Pulse 72   Ht 162.6 cm (64\")   Wt 69.9 kg (154 lb)   BMI 26.43 kg/mý     Vitals and nursing note reviewed.   Constitutional:       Appearance: Healthy appearance. Not in distress.   Eyes:      Conjunctiva/sclera: Conjunctivae normal.      Pupils: Pupils are equal, round, and reactive to light.   HENT:      Head: Normocephalic.   Pulmonary:      Effort: Pulmonary effort is normal.      Breath sounds: Normal breath sounds.   Cardiovascular:      PMI at left midclavicular line. Normal rate. Regular rhythm.      Murmurs: There is a grade 3/6 harsh midsystolic murmur at the URSB, radiating to the neck. There is also a grade 4/6 high frequency blowing holosystolic murmur at the apex.   Abdominal:      General: Bowel sounds are normal.      Palpations: Abdomen is soft.   Musculoskeletal: Normal range of motion.      Cervical back: Normal range of motion and neck supple. Skin:     General: Skin is warm and dry.   Neurological:      Mental Status: Alert, oriented to person, place, and time and oriented to person, place and time.       ECG 12 Lead    Date/Time: 8/15/2023 1:32 PM  Performed by: Jamison Judge MD  Authorized by: Jamison Judge MD   Comparison: compared with previous ECG from 9/9/2019  Comparison to previous ECG: LBBB  Rhythm: sinus rhythm  Rate: normal  Conduction: left bundle branch block and left anterior fascicular block  QRS axis: left    Clinical impression: abnormal EKG        @ASSESSMENT/PLAN@  BMI is >= 25 and <30. (Overweight) The following options were offered after discussion;: weight loss educational material (shared in after visit summary), exercise counseling/recommendations, and nutrition counseling/recommendations     Diagnoses and all orders for this visit:    1. Shortness of breath (Primary)  -     Adult Transthoracic Echo Complete W/ Cont if Necessary Per Protocol; " Future  -     BNP; Future    2. Essential hypertension  -     losartan (Cozaar) 50 MG tablet; Take 1 tablet by mouth Daily.  Dispense: 30 tablet; Refill: 6  -     Comprehensive Metabolic Panel; Future    3. Hypercholesteremia  -     Lipid Panel; Future    4. Palpitations  -     TSH; Future    5. LBBB (left bundle branch block)  -     Stress Test With Myocardial Perfusion One Day; Future    6. Silent myocardial ischemia  -     Stress Test With Myocardial Perfusion One Day; Future    7. Chronic fatigue  -     CBC & Differential; Future  -     Vitamin B12; Future  -     Folate; Future    8. Sleep apnea in adult    9. Numbness  -     Vitamin B12; Future  -     Folate; Future  -     US Carotid Bilateral; Future    10. Hyperglycemia  -     Hemoglobin A1c; Future    11. Heart murmur    12. Dizziness  -     US Carotid Bilateral; Future    At baseline her heart rate is stable.  Her blood pressure is mildly elevated.  Her EKG showed sinus rhythm with left bundle branch block and left axis deviation.  Her clinical examination reveals a BMI of 26.  She does have 3/6 ejection systolic murmur at the aortic area and a 4/6 systolic murmur at the mitral area.  She has 1+ pedal edema.    Her predominant symptom is the shortness of breath which seems to be progressively getting worse.  Given the fact that she also has a new left bundle branch block, I advised her to undergo an echocardiogram to evaluate the LV function, valvular structures as well as the PA pressure.    Regarding her hypertension, the blood pressure is still elevated.  I increased her dose of Cozaar to 50 mg once a day    Regarding her history of hypercholesterolemia, she is not on any statin at this time.  I like to recheck a lipid profile.    Regarding her history of intermittent palpitation, like to check a TSH and magnesium level    Regarding her left bundle branch block, it is completely new.  I could not find the EKG done in June at Dr. Perez's office but the  report shows the QRS complex was normal.  I explained to her about the left bundle branch block and the possibility of dyssynergy and causing LV dysfunction.  We will review the echocardiogram when it is available.  Also explained to her about the possibility of silent myocardial ischemia.  I scheduled her to undergo a Lexiscan to rule it out    Regarding her chronic fatigue, it could be related to LV dysfunction but need to rule out other causes.  Advised her to check her blood count, B12 and folic acid level    Regarding his sleep apnea, she does use CPAP as well as oxygen    Regarding her numbness, need to rule out any neuropathy.  If those are normal she may also need to rule out any old stroke.  I scheduled her to undergo ultrasound of the carotid    Regarding his history of mild hyperglycemia in the past, advised her to check her A1c level    Regarding the heart murmur, it appears to be from mitral regurgitation and possible aortic sclerosis.  We will get an echocardiogram to evaluate the valvular structures    Regarding the dizziness, we will get a carotid ultrasound    Regarding the advanced directive, talked to her about living will and power of .  She apparently does have a living will but do not have a power of .    Based on the results of the stress and echo, further recommendations will be made               Electronically signed by Jamison Judge MD August 15, 2023 13:29 EDT

## 2023-09-12 ENCOUNTER — LAB (OUTPATIENT)
Dept: LAB | Facility: HOSPITAL | Age: 78
End: 2023-09-12
Payer: MEDICARE

## 2023-09-12 ENCOUNTER — HOSPITAL ENCOUNTER (OUTPATIENT)
Dept: CARDIOLOGY | Facility: HOSPITAL | Age: 78
Discharge: HOME | End: 2023-09-12
Payer: MEDICARE

## 2023-09-12 VITALS — BODY MASS INDEX: 26.31 KG/M2 | HEIGHT: 64 IN | WEIGHT: 154.1 LBS

## 2023-09-12 DIAGNOSIS — R20.0 NUMBNESS: ICD-10-CM

## 2023-09-12 DIAGNOSIS — E78.00 HYPERCHOLESTEREMIA: ICD-10-CM

## 2023-09-12 DIAGNOSIS — I10 ESSENTIAL HYPERTENSION: ICD-10-CM

## 2023-09-12 DIAGNOSIS — I25.6 SILENT MYOCARDIAL ISCHEMIA: ICD-10-CM

## 2023-09-12 DIAGNOSIS — R06.02 SHORTNESS OF BREATH: ICD-10-CM

## 2023-09-12 DIAGNOSIS — R42 DIZZINESS: ICD-10-CM

## 2023-09-12 DIAGNOSIS — R53.82 CHRONIC FATIGUE: ICD-10-CM

## 2023-09-12 DIAGNOSIS — R00.2 PALPITATIONS: ICD-10-CM

## 2023-09-12 DIAGNOSIS — I44.7 LBBB (LEFT BUNDLE BRANCH BLOCK): ICD-10-CM

## 2023-09-12 DIAGNOSIS — R73.9 HYPERGLYCEMIA: ICD-10-CM

## 2023-09-12 LAB
ALBUMIN SERPL-MCNC: 4.2 G/DL (ref 3.5–5.2)
ALBUMIN/GLOB SERPL: 1.4 G/DL
ALP SERPL-CCNC: 91 U/L (ref 39–117)
ALT SERPL W P-5'-P-CCNC: 13 U/L (ref 1–33)
ANION GAP SERPL CALCULATED.3IONS-SCNC: 11.5 MMOL/L (ref 5–15)
AST SERPL-CCNC: 19 U/L (ref 1–32)
BASOPHILS # BLD AUTO: 0.05 10*3/MM3 (ref 0–0.2)
BASOPHILS NFR BLD AUTO: 0.8 % (ref 0–1.5)
BH CV REST NUCLEAR ISOTOPE DOSE: 10 MCI
BH CV STRESS COMMENTS STAGE 1: NORMAL
BH CV STRESS DOSE REGADENOSON STAGE 1: 0.4
BH CV STRESS DURATION MIN STAGE 1: 0
BH CV STRESS DURATION SEC STAGE 1: 10
BH CV STRESS NUCLEAR ISOTOPE DOSE: 30 MCI
BH CV STRESS PROTOCOL 1: NORMAL
BH CV STRESS RECOVERY BP: NORMAL MMHG
BH CV STRESS RECOVERY HR: 71 BPM
BH CV STRESS STAGE 1: 1
BILIRUB SERPL-MCNC: 0.3 MG/DL (ref 0–1.2)
BUN SERPL-MCNC: 21 MG/DL (ref 8–23)
BUN/CREAT SERPL: 21.9 (ref 7–25)
CALCIUM SPEC-SCNC: 9.6 MG/DL (ref 8.6–10.5)
CHLORIDE SERPL-SCNC: 99 MMOL/L (ref 98–107)
CHOLEST SERPL-MCNC: 196 MG/DL (ref 0–200)
CO2 SERPL-SCNC: 27.5 MMOL/L (ref 22–29)
CREAT SERPL-MCNC: 0.96 MG/DL (ref 0.57–1)
DEPRECATED RDW RBC AUTO: 44.6 FL (ref 37–54)
EGFRCR SERPLBLD CKD-EPI 2021: 60.3 ML/MIN/1.73
EOSINOPHIL # BLD AUTO: 0.37 10*3/MM3 (ref 0–0.4)
EOSINOPHIL NFR BLD AUTO: 6 % (ref 0.3–6.2)
ERYTHROCYTE [DISTWIDTH] IN BLOOD BY AUTOMATED COUNT: 13 % (ref 12.3–15.4)
GLOBULIN UR ELPH-MCNC: 3 GM/DL
GLUCOSE SERPL-MCNC: 120 MG/DL (ref 65–99)
HBA1C MFR BLD: 5.9 % (ref 4.8–5.6)
HCT VFR BLD AUTO: 38.3 % (ref 34–46.6)
HDLC SERPL-MCNC: 46 MG/DL (ref 40–60)
HGB BLD-MCNC: 12.6 G/DL (ref 12–15.9)
IMM GRANULOCYTES # BLD AUTO: 0.01 10*3/MM3 (ref 0–0.05)
IMM GRANULOCYTES NFR BLD AUTO: 0.2 % (ref 0–0.5)
LDLC SERPL CALC-MCNC: 126 MG/DL (ref 0–100)
LDLC/HDLC SERPL: 2.69 {RATIO}
LV EF NUC BP: 76 %
LYMPHOCYTES # BLD AUTO: 1.75 10*3/MM3 (ref 0.7–3.1)
LYMPHOCYTES NFR BLD AUTO: 28.3 % (ref 19.6–45.3)
MAXIMAL PREDICTED HEART RATE: 141 BPM
MCH RBC QN AUTO: 31 PG (ref 26.6–33)
MCHC RBC AUTO-ENTMCNC: 32.9 G/DL (ref 31.5–35.7)
MCV RBC AUTO: 94.1 FL (ref 79–97)
MONOCYTES # BLD AUTO: 0.44 10*3/MM3 (ref 0.1–0.9)
MONOCYTES NFR BLD AUTO: 7.1 % (ref 5–12)
NEUTROPHILS NFR BLD AUTO: 3.57 10*3/MM3 (ref 1.7–7)
NEUTROPHILS NFR BLD AUTO: 57.6 % (ref 42.7–76)
NRBC BLD AUTO-RTO: 0 /100 WBC (ref 0–0.2)
NT-PROBNP SERPL-MCNC: 680.6 PG/ML (ref 0–1800)
PERCENT MAX PREDICTED HR: 53.19 %
PLATELET # BLD AUTO: 282 10*3/MM3 (ref 140–450)
PMV BLD AUTO: 9.7 FL (ref 6–12)
POTASSIUM SERPL-SCNC: 3.6 MMOL/L (ref 3.5–5.2)
PROT SERPL-MCNC: 7.2 G/DL (ref 6–8.5)
RBC # BLD AUTO: 4.07 10*6/MM3 (ref 3.77–5.28)
SODIUM SERPL-SCNC: 138 MMOL/L (ref 136–145)
STRESS BASELINE BP: NORMAL MMHG
STRESS BASELINE HR: 75 BPM
STRESS PERCENT HR: 63 %
STRESS POST PEAK BP: NORMAL MMHG
STRESS POST PEAK HR: 75 BPM
STRESS TARGET HR: 120 BPM
TRIGL SERPL-MCNC: 132 MG/DL (ref 0–150)
TSH SERPL DL<=0.05 MIU/L-ACNC: 1.92 UIU/ML (ref 0.27–4.2)
VLDLC SERPL-MCNC: 24 MG/DL (ref 5–40)
WBC NRBC COR # BLD: 6.19 10*3/MM3 (ref 3.4–10.8)

## 2023-09-12 PROCEDURE — 93880 EXTRACRANIAL BILAT STUDY: CPT

## 2023-09-12 PROCEDURE — A9500 TC99M SESTAMIBI: HCPCS | Performed by: INTERNAL MEDICINE

## 2023-09-12 PROCEDURE — 0 TECHNETIUM SESTAMIBI: Performed by: INTERNAL MEDICINE

## 2023-09-12 PROCEDURE — 83036 HEMOGLOBIN GLYCOSYLATED A1C: CPT | Performed by: INTERNAL MEDICINE

## 2023-09-12 PROCEDURE — 83880 ASSAY OF NATRIURETIC PEPTIDE: CPT | Performed by: INTERNAL MEDICINE

## 2023-09-12 PROCEDURE — 78452 HT MUSCLE IMAGE SPECT MULT: CPT | Performed by: INTERNAL MEDICINE

## 2023-09-12 PROCEDURE — 25010000002 REGADENOSON 0.4 MG/5ML SOLUTION: Performed by: INTERNAL MEDICINE

## 2023-09-12 PROCEDURE — 93306 TTE W/DOPPLER COMPLETE: CPT

## 2023-09-12 PROCEDURE — 80053 COMPREHEN METABOLIC PANEL: CPT | Performed by: INTERNAL MEDICINE

## 2023-09-12 PROCEDURE — 85025 COMPLETE CBC W/AUTO DIFF WBC: CPT | Performed by: INTERNAL MEDICINE

## 2023-09-12 PROCEDURE — 93017 CV STRESS TEST TRACING ONLY: CPT

## 2023-09-12 PROCEDURE — 78452 HT MUSCLE IMAGE SPECT MULT: CPT

## 2023-09-12 PROCEDURE — 80061 LIPID PANEL: CPT | Performed by: INTERNAL MEDICINE

## 2023-09-12 PROCEDURE — 84443 ASSAY THYROID STIM HORMONE: CPT | Performed by: INTERNAL MEDICINE

## 2023-09-12 PROCEDURE — 82607 VITAMIN B-12: CPT | Performed by: INTERNAL MEDICINE

## 2023-09-12 PROCEDURE — 82746 ASSAY OF FOLIC ACID SERUM: CPT | Performed by: INTERNAL MEDICINE

## 2023-09-12 PROCEDURE — 93018 CV STRESS TEST I&R ONLY: CPT | Performed by: INTERNAL MEDICINE

## 2023-09-12 RX ORDER — REGADENOSON 0.08 MG/ML
0.4 INJECTION, SOLUTION INTRAVENOUS
Status: COMPLETED | OUTPATIENT
Start: 2023-09-12 | End: 2023-09-12

## 2023-09-12 RX ADMIN — TECHNETIUM TC 99M SESTAMIBI 1 DOSE: 1 INJECTION INTRAVENOUS at 08:27

## 2023-09-12 RX ADMIN — REGADENOSON 0.4 MG: 0.08 INJECTION, SOLUTION INTRAVENOUS at 09:51

## 2023-09-12 RX ADMIN — TECHNETIUM TC 99M SESTAMIBI 1 DOSE: 1 INJECTION INTRAVENOUS at 09:52

## 2023-09-13 LAB
AORTIC DIMENSIONLESS INDEX: 0.88 (DI)
BH CV ECHO MEAS - ACS: 1.57 CM
BH CV ECHO MEAS - AI P1/2T: 499.6 MSEC
BH CV ECHO MEAS - AO MAX PG: 6.4 MMHG
BH CV ECHO MEAS - AO MEAN PG: 3.6 MMHG
BH CV ECHO MEAS - AO ROOT DIAM: 2.9 CM
BH CV ECHO MEAS - AO V2 MAX: 126.1 CM/SEC
BH CV ECHO MEAS - AO V2 VTI: 27.3 CM
BH CV ECHO MEAS - EDV(CUBED): 118.4 ML
BH CV ECHO MEAS - EF_3D-VOL: 66 %
BH CV ECHO MEAS - ESV(CUBED): 39.6 ML
BH CV ECHO MEAS - FS: 30.6 %
BH CV ECHO MEAS - IVS/LVPW: 1.32 CM
BH CV ECHO MEAS - IVSD: 1.3 CM
BH CV ECHO MEAS - LA A2CS (ATRIAL LENGTH): 6.2 CM
BH CV ECHO MEAS - LA A4C LENGTH: 5.8 CM
BH CV ECHO MEAS - LA DIMENSION: 4.2 CM
BH CV ECHO MEAS - LAT PEAK E' VEL: 5.9 CM/SEC
BH CV ECHO MEAS - LV MASS(C)D: 212.3 GRAMS
BH CV ECHO MEAS - LV MAX PG: 5.1 MMHG
BH CV ECHO MEAS - LV MEAN PG: 2.5 MMHG
BH CV ECHO MEAS - LV V1 MAX: 112.9 CM/SEC
BH CV ECHO MEAS - LV V1 VTI: 24.3 CM
BH CV ECHO MEAS - LVIDD: 4.9 CM
BH CV ECHO MEAS - LVIDS: 3.4 CM
BH CV ECHO MEAS - LVPWD: 0.99 CM
BH CV ECHO MEAS - MED PEAK E' VEL: 4.8 CM/SEC
BH CV ECHO MEAS - MR MAX PG: 116.5 MMHG
BH CV ECHO MEAS - MR MAX VEL: 539.6 CM/SEC
BH CV ECHO MEAS - MR MEAN PG: 71.6 MMHG
BH CV ECHO MEAS - MR MEAN VEL: 381.7 CM/SEC
BH CV ECHO MEAS - MR VTI: 210.6 CM
BH CV ECHO MEAS - MV A MAX VEL: 136 CM/SEC
BH CV ECHO MEAS - MV DEC SLOPE: 611.2 CM/SEC2
BH CV ECHO MEAS - MV DEC TIME: 0.22 MSEC
BH CV ECHO MEAS - MV E MAX VEL: 130 CM/SEC
BH CV ECHO MEAS - MV E/A: 0.96
BH CV ECHO MEAS - MV MAX PG: 11.2 MMHG
BH CV ECHO MEAS - MV MEAN PG: 4 MMHG
BH CV ECHO MEAS - MV P1/2T: 71.3 MSEC
BH CV ECHO MEAS - MV V2 VTI: 41.4 CM
BH CV ECHO MEAS - MVA(P1/2T): 3.1 CM2
BH CV ECHO MEAS - PA V2 MAX: 101 CM/SEC
BH CV ECHO MEAS - PI END-D VEL: 148.8 CM/SEC
BH CV ECHO MEAS - RAP SYSTOLE: 8 MMHG
BH CV ECHO MEAS - RV MAX PG: 1.21 MMHG
BH CV ECHO MEAS - RV V1 MAX: 54.9 CM/SEC
BH CV ECHO MEAS - RV V1 VTI: 12.4 CM
BH CV ECHO MEAS - RVSP: 34 MMHG
BH CV ECHO MEAS - TAPSE (>1.6): 2.01 CM
BH CV ECHO MEAS - TR MAX PG: 26 MMHG
BH CV ECHO MEAS - TR MAX VEL: 255 CM/SEC
BH CV ECHO MEASUREMENTS AVERAGE E/E' RATIO: 24.3
BH CV XLRA - TDI S': 10.7 CM/SEC
FOLATE SERPL-MCNC: >20 NG/ML (ref 4.78–24.2)
LEFT ATRIUM VOLUME INDEX: 45.2 ML/M2
LEFT ATRIUM VOLUME: 78 ML
SINUS: 2.9 CM
VIT B12 BLD-MCNC: 1010 PG/ML (ref 211–946)

## 2023-09-14 ENCOUNTER — TELEPHONE (OUTPATIENT)
Dept: CARDIOLOGY | Facility: CLINIC | Age: 78
End: 2023-09-14
Payer: MEDICARE

## 2023-09-14 RX ORDER — LOSARTAN POTASSIUM 100 MG/1
100 TABLET ORAL DAILY
Qty: 90 TABLET | Refills: 3 | Status: SHIPPED | OUTPATIENT
Start: 2023-09-14

## 2023-09-14 RX ORDER — ROSUVASTATIN CALCIUM 20 MG/1
20 TABLET, COATED ORAL DAILY
Qty: 90 TABLET | Refills: 3 | Status: SHIPPED | OUTPATIENT
Start: 2023-09-14

## 2023-09-14 RX ORDER — ISOSORBIDE MONONITRATE 30 MG/1
30 TABLET, EXTENDED RELEASE ORAL DAILY
Qty: 30 TABLET | Refills: 11 | Status: SHIPPED | OUTPATIENT
Start: 2023-09-14

## 2023-09-14 NOTE — TELEPHONE ENCOUNTER
----- Message from Jamison Judge MD sent at 9/13/2023  7:23 AM EDT -----  Increase Cozaar dose  Imdur 30  Cath if more chest pain  Jamison Judge MD  9/13/2023  7:36 AM EDT       May need a right and left heart catheterization if patient continued to have chest tightness

## 2023-09-14 NOTE — TELEPHONE ENCOUNTER
Patient aware of stress test results and recommendations to increase losartan to 100 mg daily and add Imdur 30 mg daily.  She is aware to call the office if her symptoms persist, may need cath (left and right cardiac cath per echo result notes, if chest tightness persist).

## 2023-09-18 DIAGNOSIS — I65.23 BILATERAL CAROTID ARTERY STENOSIS: ICD-10-CM

## 2023-09-18 DIAGNOSIS — R42 DIZZINESS: Primary | ICD-10-CM

## 2023-09-26 ENCOUNTER — TELEPHONE (OUTPATIENT)
Dept: CARDIOLOGY | Facility: CLINIC | Age: 78
End: 2023-09-26
Payer: MEDICARE

## 2023-09-26 NOTE — TELEPHONE ENCOUNTER
Caller: Sheryl Bryan    Relationship: Self    Best call back number: [phone]     What is the best time to reach you: ANYTIME UNTIL 3:00PM     What was the call regarding: PT IS NEEDING HER INSURANCE NUMBERS OF THE PROCEDURE SHE HAD THAT WAS COVERED BY [facility]  (UNKNOWN, MAY HAVE JUST BEEN OFFICE VISIT

## 2023-10-03 ENCOUNTER — TELEPHONE (OUTPATIENT)
Dept: CARDIOLOGY | Facility: CLINIC | Age: 78
End: 2023-10-03
Payer: MEDICARE

## 2023-10-03 NOTE — TELEPHONE ENCOUNTER
CHRISS Novak from [facility] called requesting copies of the pt's recent testing to be faxed to TCU.  Records faxed to the fax number provided.  Kisha ZHANG will notify me if the results aren't received.

## 2023-10-17 ENCOUNTER — OFFICE VISIT (OUTPATIENT)
Dept: CARDIOLOGY | Facility: CLINIC | Age: 78
End: 2023-10-17
Payer: MEDICARE

## 2023-10-17 VITALS
HEIGHT: 64 IN | WEIGHT: 155 LBS | BODY MASS INDEX: 26.46 KG/M2 | HEART RATE: 68 BPM | DIASTOLIC BLOOD PRESSURE: 50 MMHG | SYSTOLIC BLOOD PRESSURE: 90 MMHG

## 2023-10-17 DIAGNOSIS — I10 PRIMARY HYPERTENSION: ICD-10-CM

## 2023-10-17 DIAGNOSIS — R01.1 HEART MURMUR: ICD-10-CM

## 2023-10-17 DIAGNOSIS — R06.02 SHORTNESS OF BREATH: Primary | ICD-10-CM

## 2023-10-17 DIAGNOSIS — I44.7 LBBB (LEFT BUNDLE BRANCH BLOCK): ICD-10-CM

## 2023-10-17 DIAGNOSIS — E78.00 HYPERCHOLESTEREMIA: ICD-10-CM

## 2023-10-17 DIAGNOSIS — I34.0 MITRAL VALVE INSUFFICIENCY, UNSPECIFIED ETIOLOGY: ICD-10-CM

## 2023-10-17 PROCEDURE — 99214 OFFICE O/P EST MOD 30 MIN: CPT | Performed by: INTERNAL MEDICINE

## 2023-10-17 PROCEDURE — 1159F MED LIST DOCD IN RCRD: CPT | Performed by: INTERNAL MEDICINE

## 2023-10-17 PROCEDURE — 3074F SYST BP LT 130 MM HG: CPT | Performed by: INTERNAL MEDICINE

## 2023-10-17 PROCEDURE — 1160F RVW MEDS BY RX/DR IN RCRD: CPT | Performed by: INTERNAL MEDICINE

## 2023-10-17 PROCEDURE — 3078F DIAST BP <80 MM HG: CPT | Performed by: INTERNAL MEDICINE

## 2023-10-17 RX ORDER — AMLODIPINE BESYLATE 5 MG/1
5 TABLET ORAL DAILY
Qty: 90 TABLET | Refills: 3 | Status: SHIPPED | OUTPATIENT
Start: 2023-10-17

## 2023-10-17 RX ORDER — AMLODIPINE BESYLATE 5 MG/1
5 TABLET ORAL DAILY
COMMUNITY
End: 2023-10-17 | Stop reason: SDUPTHER

## 2023-10-17 RX ORDER — BETHANECHOL CHLORIDE 10 MG/1
1 TABLET ORAL DAILY PRN
COMMUNITY
Start: 2023-10-07

## 2023-10-17 NOTE — LETTER
October 17, 2023       No Recipients    Patient: Sheryl Bryan   YOB: 1945   Date of Visit: 10/17/2023     Dear Rodney Swift MD:       Thank you for referring Sheryl Bryan to me for evaluation. Below are the relevant portions of my assessment and plan of care.    If you have questions, please do not hesitate to call me. I look forward to following Sheryl along with you.         Sincerely,        Jamison Judge MD        CC:   No Recipients    Jamison Judge MD  10/17/23 1407  Sign when Signing Visit  Chief Complaint   Patient presents with   • Hospital Follow Up Visit     For cardiac management, recent admission with cath at [facility], records will be in door ( cath report scanned in chart) . Reports doing well since discharge. Denies any CP   • Med Refill     Refills needed on amlodipine, 90 days to [facility]   • medication question     Pt has been taking ASA 81 mg BID, discharge paper stated daily. Asking for your recommendations as to how often to take.    • Medication changes     Amlodipine 5 mg daily added at discharge, reports BP still running around 168/70 at home.        CARDIAC COMPLAINTS  dyspnea and fatigue        Subjective  Sheryl Bryan is a 78 y.o. female came in today to be reevaluated.  She has history of hypertension, hypercholesterolemia was seen here about 2 months ago for increasing shortness of breath.  She underwent a stress test and echocardiogram.  The stress test showed questionable anterior wall ischemia versus bundle branch block.  Her blood pressure was also elevated.  The echocardiogram showed normal LV systolic function with moderate to severe MR.  She was advised to increase the dose of medication and plan to do an elective cardiac catheterization.  Apparently, she did not tell her family members about the results.  She got admitted about 2 weeks ago with increasing shortness of breath and found to be in pulmonary edema.  She underwent cardiac catheterization by another  cardiologist found to have a coronaries were normal.  She did not undergo LV angiogram.  Her echocardiogram continues to show moderate to severe MR.  She also underwent ultrasound of the kidneys which did not show any evidence of renal artery stenosis.  Amlodipine was added.  She came today with her daughter to discuss about the results.  She still has some shortness of breath but not as bad as before.  She had a lot of questions about the medications.              Cardiac History  Past Surgical History:   Procedure Laterality Date   • CARDIAC CATHETERIZATION  10/05/2023    Dr. Kulkarni- 40% LAD. DFR 0.93   • CARDIOVASCULAR STRESS TEST  03/31/2015    4 Min, 82% THR. BP-- 180/80. Negative   • CARDIOVASCULAR STRESS TEST  09/12/2023    Lexiscan- EF > 70%. 169/87. LBBB. R/O Anterior Ischemia   • ECHO - CONVERTED  12/23/2014    ([facility]) EF 65%. RVSP 48 mmHg   • ECHO - CONVERTED  01/14/2020    EF 65%. LA- 4.1 Cm. Mild MR & AI. RVSP- 30 mmHg.   • ECHO - CONVERTED  09/12/2023    EF 65%. LA- 4.2. Mod-Severe MR. Mild AI. RVSP- 34 mmHg   • ECHO - CONVERTED  10/05/2023    @ [facility]. Dr. Kulkarni- EF 65%. LA- 4.0. Mod-Severe MR. Mild AI. RVSP- 47 mmHg   • US CAROTID UNILATERAL  09/12/2023    50-69% Both ICA       Current Outpatient Medications   Medication Sig Dispense Refill   • amLODIPine (NORVASC) 5 MG tablet Take 1 tablet by mouth Daily. 90 tablet 3   • aspirin 81 MG EC tablet Take 1 tablet by mouth 2 (Two) Times a Day.     • bethanechol (URECHOLINE) 10 MG tablet Take 1 tablet by mouth Daily As Needed.     • carvedilol (COREG) 12.5 MG tablet Take 1 tablet by mouth 2 (Two) Times a Day With Meals.     • Cholecalciferol (VITAMIN D) 2000 UNITS tablet Take 1 tablet by mouth Daily.     • doxepin (SINEquan) 75 MG capsule Take 1 capsule by mouth Every Night.     • furosemide (LASIX) 40 MG tablet Take 1 tablet by mouth 2 (Two) Times a Day As Needed.     • isosorbide mononitrate (IMDUR) 30 MG 24 hr tablet Take 1 tablet by mouth Daily. 30 tablet  11   • losartan (Cozaar) 100 MG tablet Take 1 tablet by mouth Daily. 90 tablet 3   • montelukast (SINGULAIR) 10 MG tablet Take 1 tablet by mouth Every Night.     • naproxen sodium (ALEVE) 220 MG tablet Take 1 tablet by mouth every night at bedtime.     • pantoprazole (PROTONIX) 40 MG EC tablet Take 1 tablet by mouth Daily.     • PARoxetine (PAXIL) 20 MG tablet Take 1 tablet by mouth Every Morning.     • potassium chloride (K-DUR,KLOR-CON) 20 MEQ CR tablet Take 1 tablet by mouth Daily.     • Probiotic Product (PROBIOTIC DAILY PO) Take 100 mg by mouth 2 (Two) Times a Day.     • rosuvastatin (CRESTOR) 20 MG tablet Take 1 tablet by mouth Daily. 90 tablet 3   • tiZANidine (ZANAFLEX) 4 MG tablet Take 1 tablet by mouth At Night As Needed for Muscle Spasms.     • vitamin B-12 (CYANOCOBALAMIN) 500 MCG tablet Take 1 tablet by mouth Daily.       No current facility-administered medications for this visit.       Allergies  :  Penicillins       Past Medical History:   Diagnosis Date   • GERD (gastroesophageal reflux disease)    • H/O colonoscopy    • History of appendectomy    • Hypertension    • Sleep apnea        Social History     Socioeconomic History   • Marital status:    Tobacco Use   • Smoking status: Former     Types: Cigarettes     Quit date:      Years since quittin.8   • Smokeless tobacco: Never   Vaping Use   • Vaping Use: Never used   Substance and Sexual Activity   • Alcohol use: Yes     Comment: socially- occasional   • Drug use: No       Family History   Problem Relation Age of Onset   • Cancer Mother         leukemia   • Aneurysm Father    • Stroke Maternal Aunt    • Diabetes Maternal Grandmother        Review of Systems   Constitutional: Positive for malaise/fatigue. Negative for decreased appetite.   HENT:  Negative for congestion and sore throat.    Eyes:  Negative for blurred vision, double vision and visual disturbance.   Cardiovascular:  Positive for dyspnea on exertion. Negative for chest  "pain.   Respiratory:  Positive for shortness of breath. Negative for snoring.    Endocrine: Negative for cold intolerance and heat intolerance.   Hematologic/Lymphatic: Negative for adenopathy. Does not bruise/bleed easily.   Skin:  Negative for itching, nail changes and skin cancer.   Musculoskeletal:  Negative for arthritis and myalgias.   Gastrointestinal:  Negative for abdominal pain, dysphagia and heartburn.   Genitourinary:  Negative for bladder incontinence and frequency.   Neurological:  Negative for dizziness, seizures and vertigo.   Psychiatric/Behavioral:  Negative for altered mental status.    Allergic/Immunologic: Negative for environmental allergies and hives.     Diabetes- No  Thyroid- normal    Objective    BP 90/50 (BP Location: Left arm)   Pulse 68   Ht 162.6 cm (64\")   Wt 70.3 kg (155 lb)   BMI 26.61 kg/m²     Vitals and nursing note reviewed.   Constitutional:       Appearance: Healthy appearance. Not in distress.   Eyes:      Conjunctiva/sclera: Conjunctivae normal.      Pupils: Pupils are equal, round, and reactive to light.   HENT:      Head: Normocephalic.   Pulmonary:      Effort: Pulmonary effort is normal.      Breath sounds: Normal breath sounds.   Cardiovascular:      PMI at left midclavicular line. Normal rate. Regular rhythm.      Murmurs: There is a grade 4/6 high frequency blowing holosystolic murmur at the apex.   Abdominal:      General: Bowel sounds are normal.      Palpations: Abdomen is soft.   Musculoskeletal: Normal range of motion.      Cervical back: Normal range of motion and neck supple. Skin:     General: Skin is warm and dry.   Neurological:      Mental Status: Alert, oriented to person, place, and time and oriented to person, place and time.   Procedures            @ASSESSMENT/PLAN@        Diagnoses and all orders for this visit:    1. Shortness of breath (Primary)    2. LBBB (left bundle branch block)    3. Heart murmur  -     amLODIPine (NORVASC) 5 MG tablet; Take " 1 tablet by mouth Daily.  Dispense: 90 tablet; Refill: 3  -     Ambulatory Referral to Cardiovascular Surgery    4. Mitral valve insufficiency, unspecified etiology  -     amLODIPine (NORVASC) 5 MG tablet; Take 1 tablet by mouth Daily.  Dispense: 90 tablet; Refill: 3  -     Ambulatory Referral to Cardiovascular Surgery    5. Hypercholesteremia    6. Primary hypertension  -     amLODIPine (NORVASC) 5 MG tablet; Take 1 tablet by mouth Daily.  Dispense: 90 tablet; Refill: 3       At baseline her heart rate is stable.  Her blood pressure is lower limit of normal.  Her BMI is around 26.  Her cardiovascular examination reveals increased JVD in the systolic murmur at the mitral area.    Regarding the shortness of breath, this is her main problem.  It could be secondary to her uncontrolled hypertension and mitral regurgitation but the other possibility of valvular heart disease causing pulmonary congestion.  I talked to her in detail regarding that.  I explained to her that she may need to think about having a mitral valve repair done.  I will send the cath film as well as the echo CD to Dr. Emmanuel at .  If he feels that it is going to be a high risk to do surgery then we will consider either mitral valve clipping or CardioMEMS for medical management.    Regarding her chronic left bundle branch block, her LV function is still normal.  At this time she is not a candidate for a BiV ICD.    Regarding her heart murmur which is secondary to her mitral regurgitation, continue Cozaar and Coreg along with amlodipine    Regarding her hypercholesterolemia, she is on Crestor so we will continue the same for now    Regarding her hypertension, renovascular hypertension has been ruled out.  We will continue to treat with a combination of ARB, beta-blockers and calcium channel blockers    Regarding advanced directive, I talked to her again about living will and power of  and gave her booklets again.    Based on the surgeon's  input, further recommendations will be made                  Electronically signed by Jamison Judge MD October 17, 2023 13:58 EDT

## 2023-10-17 NOTE — PROGRESS NOTES
Chief Complaint   Patient presents with   • Hospital Follow Up Visit     For cardiac management, recent admission with cath at [facility], records will be in door ( cath report scanned in chart) . Reports doing well since discharge. Denies any CP   • Med Refill     Refills needed on amlodipine, 90 days to [facility]   • medication question     Pt has been taking ASA 81 mg BID, discharge paper stated daily. Asking for your recommendations as to how often to take.    • Medication changes     Amlodipine 5 mg daily added at discharge, reports BP still running around 168/70 at home.        CARDIAC COMPLAINTS  dyspnea and fatigue        Subjective   Sheryl Bryan is a 78 y.o. female came in today to be reevaluated.  She has history of hypertension, hypercholesterolemia was seen here about 2 months ago for increasing shortness of breath.  She underwent a stress test and echocardiogram.  The stress test showed questionable anterior wall ischemia versus bundle branch block.  Her blood pressure was also elevated.  The echocardiogram showed normal LV systolic function with moderate to severe MR.  She was advised to increase the dose of medication and plan to do an elective cardiac catheterization.  Apparently, she did not tell her family members about the results.  She got admitted about 2 weeks ago with increasing shortness of breath and found to be in pulmonary edema.  She underwent cardiac catheterization by another cardiologist found to have a coronaries were normal.  She did not undergo LV angiogram.  Her echocardiogram continues to show moderate to severe MR.  She also underwent ultrasound of the kidneys which did not show any evidence of renal artery stenosis.  Amlodipine was added.  She came today with her daughter to discuss about the results.  She still has some shortness of breath but not as bad as before.  She had a lot of questions about the medications.              Cardiac History  Past Surgical History:   Procedure  Laterality Date   • CARDIAC CATHETERIZATION  10/05/2023    Dr. Kulkarni- 40% LAD. DFR 0.93   • CARDIOVASCULAR STRESS TEST  03/31/2015    4 Min, 82% THR. BP-- 180/80. Negative   • CARDIOVASCULAR STRESS TEST  09/12/2023    Lexiscan- EF > 70%. 169/87. LBBB. R/O Anterior Ischemia   • ECHO - CONVERTED  12/23/2014    ([facility]) EF 65%. RVSP 48 mmHg   • ECHO - CONVERTED  01/14/2020    EF 65%. LA- 4.1 Cm. Mild MR & AI. RVSP- 30 mmHg.   • ECHO - CONVERTED  09/12/2023    EF 65%. LA- 4.2. Mod-Severe MR. Mild AI. RVSP- 34 mmHg   • ECHO - CONVERTED  10/05/2023    @ [facility]. Dr. Kulkarni- EF 65%. LA- 4.0. Mod-Severe MR. Mild AI. RVSP- 47 mmHg   • US CAROTID UNILATERAL  09/12/2023    50-69% Both ICA       Current Outpatient Medications   Medication Sig Dispense Refill   • amLODIPine (NORVASC) 5 MG tablet Take 1 tablet by mouth Daily. 90 tablet 3   • aspirin 81 MG EC tablet Take 1 tablet by mouth 2 (Two) Times a Day.     • bethanechol (URECHOLINE) 10 MG tablet Take 1 tablet by mouth Daily As Needed.     • carvedilol (COREG) 12.5 MG tablet Take 1 tablet by mouth 2 (Two) Times a Day With Meals.     • Cholecalciferol (VITAMIN D) 2000 UNITS tablet Take 1 tablet by mouth Daily.     • doxepin (SINEquan) 75 MG capsule Take 1 capsule by mouth Every Night.     • furosemide (LASIX) 40 MG tablet Take 1 tablet by mouth 2 (Two) Times a Day As Needed.     • isosorbide mononitrate (IMDUR) 30 MG 24 hr tablet Take 1 tablet by mouth Daily. 30 tablet 11   • losartan (Cozaar) 100 MG tablet Take 1 tablet by mouth Daily. 90 tablet 3   • montelukast (SINGULAIR) 10 MG tablet Take 1 tablet by mouth Every Night.     • naproxen sodium (ALEVE) 220 MG tablet Take 1 tablet by mouth every night at bedtime.     • pantoprazole (PROTONIX) 40 MG EC tablet Take 1 tablet by mouth Daily.     • PARoxetine (PAXIL) 20 MG tablet Take 1 tablet by mouth Every Morning.     • potassium chloride (K-DUR,KLOR-CON) 20 MEQ CR tablet Take 1 tablet by mouth Daily.     • Probiotic Product  (PROBIOTIC DAILY PO) Take 100 mg by mouth 2 (Two) Times a Day.     • rosuvastatin (CRESTOR) 20 MG tablet Take 1 tablet by mouth Daily. 90 tablet 3   • tiZANidine (ZANAFLEX) 4 MG tablet Take 1 tablet by mouth At Night As Needed for Muscle Spasms.     • vitamin B-12 (CYANOCOBALAMIN) 500 MCG tablet Take 1 tablet by mouth Daily.       No current facility-administered medications for this visit.       Allergies  :  Penicillins       Past Medical History:   Diagnosis Date   • GERD (gastroesophageal reflux disease)    • H/O colonoscopy    • History of appendectomy    • Hypertension    • Sleep apnea        Social History     Socioeconomic History   • Marital status:    Tobacco Use   • Smoking status: Former     Types: Cigarettes     Quit date:      Years since quittin.8   • Smokeless tobacco: Never   Vaping Use   • Vaping Use: Never used   Substance and Sexual Activity   • Alcohol use: Yes     Comment: socially- occasional   • Drug use: No       Family History   Problem Relation Age of Onset   • Cancer Mother         leukemia   • Aneurysm Father    • Stroke Maternal Aunt    • Diabetes Maternal Grandmother        Review of Systems   Constitutional: Positive for malaise/fatigue. Negative for decreased appetite.   HENT:  Negative for congestion and sore throat.    Eyes:  Negative for blurred vision, double vision and visual disturbance.   Cardiovascular:  Positive for dyspnea on exertion. Negative for chest pain.   Respiratory:  Positive for shortness of breath. Negative for snoring.    Endocrine: Negative for cold intolerance and heat intolerance.   Hematologic/Lymphatic: Negative for adenopathy. Does not bruise/bleed easily.   Skin:  Negative for itching, nail changes and skin cancer.   Musculoskeletal:  Negative for arthritis and myalgias.   Gastrointestinal:  Negative for abdominal pain, dysphagia and heartburn.   Genitourinary:  Negative for bladder incontinence and frequency.   Neurological:  Negative for  "dizziness, seizures and vertigo.   Psychiatric/Behavioral:  Negative for altered mental status.    Allergic/Immunologic: Negative for environmental allergies and hives.     Diabetes- No  Thyroid- normal    Objective     BP 90/50 (BP Location: Left arm)   Pulse 68   Ht 162.6 cm (64\")   Wt 70.3 kg (155 lb)   BMI 26.61 kg/m²     Vitals and nursing note reviewed.   Constitutional:       Appearance: Healthy appearance. Not in distress.   Eyes:      Conjunctiva/sclera: Conjunctivae normal.      Pupils: Pupils are equal, round, and reactive to light.   HENT:      Head: Normocephalic.   Pulmonary:      Effort: Pulmonary effort is normal.      Breath sounds: Normal breath sounds.   Cardiovascular:      PMI at left midclavicular line. Normal rate. Regular rhythm.      Murmurs: There is a grade 4/6 high frequency blowing holosystolic murmur at the apex.   Abdominal:      General: Bowel sounds are normal.      Palpations: Abdomen is soft.   Musculoskeletal: Normal range of motion.      Cervical back: Normal range of motion and neck supple. Skin:     General: Skin is warm and dry.   Neurological:      Mental Status: Alert, oriented to person, place, and time and oriented to person, place and time.   Procedures            @ASSESSMENT/PLAN@        Diagnoses and all orders for this visit:    1. Shortness of breath (Primary)    2. LBBB (left bundle branch block)    3. Heart murmur  -     amLODIPine (NORVASC) 5 MG tablet; Take 1 tablet by mouth Daily.  Dispense: 90 tablet; Refill: 3  -     Ambulatory Referral to Cardiovascular Surgery    4. Mitral valve insufficiency, unspecified etiology  -     amLODIPine (NORVASC) 5 MG tablet; Take 1 tablet by mouth Daily.  Dispense: 90 tablet; Refill: 3  -     Ambulatory Referral to Cardiovascular Surgery    5. Hypercholesteremia    6. Primary hypertension  -     amLODIPine (NORVASC) 5 MG tablet; Take 1 tablet by mouth Daily.  Dispense: 90 tablet; Refill: 3       At baseline her heart rate is " stable.  Her blood pressure is lower limit of normal.  Her BMI is around 26.  Her cardiovascular examination reveals increased JVD in the systolic murmur at the mitral area.    Regarding the shortness of breath, this is her main problem.  It could be secondary to her uncontrolled hypertension and mitral regurgitation but the other possibility of valvular heart disease causing pulmonary congestion.  I talked to her in detail regarding that.  I explained to her that she may need to think about having a mitral valve repair done.  I will send the cath film as well as the echo CD to Dr. Emmanuel at .  If he feels that it is going to be a high risk to do surgery then we will consider either mitral valve clipping or CardioMEMS for medical management.    Regarding her chronic left bundle branch block, her LV function is still normal.  At this time she is not a candidate for a BiV ICD.    Regarding her heart murmur which is secondary to her mitral regurgitation, continue Cozaar and Coreg along with amlodipine    Regarding her hypercholesterolemia, she is on Crestor so we will continue the same for now    Regarding her hypertension, renovascular hypertension has been ruled out.  We will continue to treat with a combination of ARB, beta-blockers and calcium channel blockers    Regarding advanced directive, I talked to her again about living will and power of  and gave her booklets again.    Based on the surgeon's input, further recommendations will be made                  Electronically signed by Jamison Judge MD October 17, 2023 13:58 EDT

## 2023-12-11 ENCOUNTER — TELEPHONE (OUTPATIENT)
Dept: CARDIOLOGY | Facility: CLINIC | Age: 78
End: 2023-12-11

## 2024-02-07 ENCOUNTER — OFFICE VISIT (OUTPATIENT)
Dept: CARDIOLOGY | Facility: CLINIC | Age: 79
End: 2024-02-07
Payer: MEDICARE

## 2024-02-07 VITALS
BODY MASS INDEX: 25.27 KG/M2 | WEIGHT: 148 LBS | SYSTOLIC BLOOD PRESSURE: 126 MMHG | DIASTOLIC BLOOD PRESSURE: 74 MMHG | HEART RATE: 66 BPM | HEIGHT: 64 IN

## 2024-02-07 DIAGNOSIS — E78.00 HYPERCHOLESTEREMIA: ICD-10-CM

## 2024-02-07 DIAGNOSIS — I48.0 PAF (PAROXYSMAL ATRIAL FIBRILLATION): ICD-10-CM

## 2024-02-07 DIAGNOSIS — I34.0 MITRAL VALVE INSUFFICIENCY, UNSPECIFIED ETIOLOGY: Primary | ICD-10-CM

## 2024-02-07 DIAGNOSIS — R01.1 HEART MURMUR: ICD-10-CM

## 2024-02-07 DIAGNOSIS — R06.02 SHORTNESS OF BREATH: ICD-10-CM

## 2024-02-07 DIAGNOSIS — I10 PRIMARY HYPERTENSION: ICD-10-CM

## 2024-02-07 DIAGNOSIS — R53.82 CHRONIC FATIGUE: ICD-10-CM

## 2024-02-07 DIAGNOSIS — I44.7 LBBB (LEFT BUNDLE BRANCH BLOCK): ICD-10-CM

## 2024-02-07 DIAGNOSIS — R00.2 PALPITATIONS: ICD-10-CM

## 2024-02-07 PROCEDURE — 93000 ELECTROCARDIOGRAM COMPLETE: CPT | Performed by: INTERNAL MEDICINE

## 2024-02-07 PROCEDURE — 1159F MED LIST DOCD IN RCRD: CPT | Performed by: INTERNAL MEDICINE

## 2024-02-07 PROCEDURE — 99214 OFFICE O/P EST MOD 30 MIN: CPT | Performed by: INTERNAL MEDICINE

## 2024-02-07 PROCEDURE — 1160F RVW MEDS BY RX/DR IN RCRD: CPT | Performed by: INTERNAL MEDICINE

## 2024-02-07 PROCEDURE — 3078F DIAST BP <80 MM HG: CPT | Performed by: INTERNAL MEDICINE

## 2024-02-07 PROCEDURE — 3074F SYST BP LT 130 MM HG: CPT | Performed by: INTERNAL MEDICINE

## 2024-02-07 RX ORDER — CYCLOBENZAPRINE HCL 5 MG
5 TABLET ORAL DAILY
COMMUNITY

## 2024-02-07 RX ORDER — MAGNESIUM OXIDE 400 MG/1
400 TABLET ORAL DAILY
COMMUNITY

## 2024-02-07 RX ORDER — WARFARIN SODIUM 1 MG/1
TABLET ORAL
COMMUNITY

## 2024-02-07 RX ORDER — FUROSEMIDE 40 MG/1
40 TABLET ORAL 2 TIMES DAILY PRN
Qty: 45 TABLET | Refills: 3 | Status: SHIPPED | OUTPATIENT
Start: 2024-02-07

## 2024-02-07 RX ORDER — AMLODIPINE BESYLATE 5 MG/1
5 TABLET ORAL DAILY
Qty: 90 TABLET | Refills: 3 | Status: SHIPPED | OUTPATIENT
Start: 2024-02-07

## 2024-02-07 RX ORDER — MULTIPLE VITAMINS W/ MINERALS TAB 9MG-400MCG
1 TAB ORAL DAILY
COMMUNITY

## 2024-02-07 RX ORDER — ROSUVASTATIN CALCIUM 20 MG/1
20 TABLET, COATED ORAL DAILY
Qty: 90 TABLET | Refills: 3 | Status: SHIPPED | OUTPATIENT
Start: 2024-02-07

## 2024-02-07 RX ORDER — POTASSIUM CHLORIDE 20 MEQ/1
20 TABLET, EXTENDED RELEASE ORAL DAILY
Qty: 90 TABLET | Refills: 3 | Status: SHIPPED | OUTPATIENT
Start: 2024-02-07

## 2024-02-07 RX ORDER — AMIODARONE HYDROCHLORIDE 200 MG/1
200 TABLET ORAL DAILY
COMMUNITY
End: 2024-02-07 | Stop reason: SDUPTHER

## 2024-02-07 RX ORDER — AMIODARONE HYDROCHLORIDE 200 MG/1
200 TABLET ORAL DAILY
Qty: 90 TABLET | Refills: 3 | Status: SHIPPED | OUTPATIENT
Start: 2024-02-07

## 2024-02-07 NOTE — PROGRESS NOTES
Chief Complaint   Patient presents with   • Follow-up     Went to ER in Oct with SOB, cardiac workup showed Mod -Sev MR. MVR on 11/17/23, Dr Emmanuel at . Pt reports having had complications post op and was there for several days then went to [facility] for 8 days.  All records in [place]. Still having some fatigue but overall doing well.    • Labs     Pt reports her INR is being checked by home health and followed by Dr Swift. No other labs since discharge.    • Med Refill     Refills needed on cardiac meds, 90 days to [facility].        CARDIAC COMPLAINTS  fatigue        Subjective   Sherylmaxine Bryan is a 78 y.o. female came in today for her hospital follow-up visit.  She has history of hypertension, hypercholesterolemia who also had left bundle branch block and an abnormal stress test.  She was seen in October after being in the hospital for shortness of breath and found to have significant mitral regurgitation.  The film was reviewed by Dr. Emmanuel and underwent mechanical mitral valve replacement in November.  She is now have the INR checked by home health.  Her main symptom at this time is tiredness and fatigue.  She had to start the cardiac rehab.  She denies having any chest pain.  Her shortness of breath is improved.  Apparently her postop was complicated with PAF and she is taking low-dose of amiodarone.              Cardiac History  Past Surgical History:   Procedure Laterality Date   • CARDIAC CATHETERIZATION  10/05/2023    Dr. Kulkarni- 40% LAD. DFR 0.93   • CARDIOVASCULAR STRESS TEST  03/31/2015    4 Min, 82% THR. BP-- 180/80. Negative   • CARDIOVASCULAR STRESS TEST  09/12/2023    Lexiscan- EF > 70%. 169/87. LBBB. R/O Anterior Ischemia   • ECHO - CONVERTED  12/23/2014    ([facility]) EF 65%. RVSP 48 mmHg   • ECHO - CONVERTED  01/14/2020    EF 65%. LA- 4.1 Cm. Mild MR & AI. RVSP- 30 mmHg.   • ECHO - CONVERTED  09/12/2023    EF 65%. LA- 4.2. Mod-Severe MR. Mild AI. RVSP- 34 mmHg   • ECHO - CONVERTED  10/05/2023    @ [facility].  Dr. Kulkarni- EF 65%. LA- 4.0. Mod-Severe MR. Mild AI. RVSP- 47 mmHg   • MITRAL VALVE REPLACEMENT  11/17/2023    - # 27 [address].Bossman Mechanical Valve   • US CAROTID UNILATERAL  09/12/2023    50-69% Both ICA       Current Outpatient Medications   Medication Sig Dispense Refill   • amiodarone (PACERONE) 200 MG tablet Take 1 tablet by mouth Daily. 90 tablet 3   • amLODIPine (NORVASC) 5 MG tablet Take 1 tablet by mouth Daily. 90 tablet 3   • aspirin 81 MG EC tablet Take 1 tablet by mouth Daily.     • Cholecalciferol (VITAMIN D) 2000 UNITS tablet Take 1 tablet by mouth Daily.     • cyclobenzaprine (FLEXERIL) 5 MG tablet Take 1 tablet by mouth Daily.     • doxepin (SINEquan) 75 MG capsule Take 1 capsule by mouth Every Night.     • furosemide (LASIX) 40 MG tablet Take 1 tablet by mouth 2 (Two) Times a Day As Needed (edema). 45 tablet 3   • magnesium oxide (MAG-OX) 400 MG tablet Take 1 tablet by mouth Daily.     • metoprolol tartrate (LOPRESSOR) 25 MG tablet Take 1 tablet by mouth Daily. 90 tablet 3   • montelukast (SINGULAIR) 10 MG tablet Take 1 tablet by mouth Every Night.     • multivitamin with minerals tablet tablet Take 1 tablet by mouth Daily.     • naproxen sodium (ALEVE) 220 MG tablet Take 1 tablet by mouth every night at bedtime.     • pantoprazole (PROTONIX) 40 MG EC tablet Take 1 tablet by mouth Daily.     • PARoxetine (PAXIL) 20 MG tablet Take 1 tablet by mouth Every Morning.     • potassium chloride (K-DUR,KLOR-CON) 20 MEQ CR tablet Take 1 tablet by mouth Daily. 90 tablet 3   • Probiotic Product (PROBIOTIC DAILY PO) Take 100 mg by mouth 2 (Two) Times a Day.     • rosuvastatin (CRESTOR) 20 MG tablet Take 1 tablet by mouth Daily. 90 tablet 3   • vitamin B-12 (CYANOCOBALAMIN) 500 MCG tablet Take 1 tablet by mouth Daily.     • warfarin (COUMADIN) 1 MG tablet PCP monitoring       No current facility-administered medications for this visit.       Allergies  :  Penicillins       Past Medical History:   Diagnosis  "Date   • GERD (gastroesophageal reflux disease)    • H/O colonoscopy    • History of appendectomy    • Hypertension    • Sleep apnea        Social History     Socioeconomic History   • Marital status:    Tobacco Use   • Smoking status: Former     Types: Cigarettes     Quit date:      Years since quittin.1   • Smokeless tobacco: Never   Vaping Use   • Vaping Use: Never used   Substance and Sexual Activity   • Alcohol use: Yes     Comment: socially- occasional   • Drug use: No       Family History   Problem Relation Age of Onset   • Cancer Mother         leukemia   • Aneurysm Father    • Stroke Maternal Aunt    • Diabetes Maternal Grandmother        Review of Systems   Constitutional: Positive for malaise/fatigue. Negative for decreased appetite.   HENT:  Negative for congestion and sore throat.    Eyes:  Negative for blurred vision, double vision and visual disturbance.   Cardiovascular:  Negative for chest pain and leg swelling.   Respiratory:  Positive for shortness of breath. Negative for snoring.    Endocrine: Negative for cold intolerance and heat intolerance.   Hematologic/Lymphatic: Negative for adenopathy. Does not bruise/bleed easily.   Skin:  Negative for itching, nail changes and skin cancer.   Musculoskeletal:  Negative for arthritis and myalgias.   Gastrointestinal:  Negative for abdominal pain, dysphagia and heartburn.   Genitourinary:  Negative for bladder incontinence and frequency.   Neurological:  Positive for dizziness. Negative for seizures and vertigo.   Psychiatric/Behavioral:  Negative for altered mental status.    Allergic/Immunologic: Negative for environmental allergies and hives.     Diabetes- No  Thyroid- normal    Objective     /74   Pulse 66   Ht 162.6 cm (64\")   Wt 67.1 kg (148 lb)   BMI 25.40 kg/m²     Vitals and nursing note reviewed.   Constitutional:       Appearance: Healthy appearance. Not in distress.   Eyes:      Conjunctiva/sclera: Conjunctivae normal. "      Pupils: Pupils are equal, round, and reactive to light.   HENT:      Head: Normocephalic.   Pulmonary:      Effort: Pulmonary effort is normal.      Breath sounds: Normal breath sounds.   Cardiovascular:      PMI at left midclavicular line. Normal rate. Regular rhythm. mechanical S1.      Murmurs: There is a grade 3/6 high frequency blowing holosystolic murmur at the apex.   Abdominal:      General: Bowel sounds are normal.      Palpations: Abdomen is soft.   Musculoskeletal: Normal range of motion.      Cervical back: Normal range of motion and neck supple. Skin:     General: Skin is warm and dry.   Neurological:      Mental Status: Alert, oriented to person, place, and time and oriented to person, place and time.     ECG 12 Lead    Date/Time: 2/7/2024 2:01 PM  Performed by: Jamison Judge MD    Authorized by: Jamison Judge MD  Comparison: compared with previous ECG from 8/15/2023  Similar to previous ECG  Rhythm: sinus rhythm  Rate: normal  Conduction: left bundle branch block and 1st degree AV block  QRS axis: normal    Clinical impression: abnormal EKG              @ASSESSMENT/PLAN@        Diagnoses and all orders for this visit:    1. Mitral valve insufficiency, unspecified etiology (Primary)  -     CBC & Differential; Future    2. Primary hypertension  -     amLODIPine (NORVASC) 5 MG tablet; Take 1 tablet by mouth Daily.  Dispense: 90 tablet; Refill: 3  -     metoprolol tartrate (LOPRESSOR) 25 MG tablet; Take 1 tablet by mouth Daily.  Dispense: 90 tablet; Refill: 3  -     Comprehensive Metabolic Panel; Future    3. Shortness of breath  -     furosemide (LASIX) 40 MG tablet; Take 1 tablet by mouth 2 (Two) Times a Day As Needed (edema).  Dispense: 45 tablet; Refill: 3  -     potassium chloride (K-DUR,KLOR-CON) 20 MEQ CR tablet; Take 1 tablet by mouth Daily.  Dispense: 90 tablet; Refill: 3    4. Chronic fatigue    5. Hypercholesteremia  -     rosuvastatin (CRESTOR) 20 MG tablet; Take 1 tablet by  mouth Daily.  Dispense: 90 tablet; Refill: 3  -     Lipid Panel; Future    6. LBBB (left bundle branch block)    7. Heart murmur    8. PAF (paroxysmal atrial fibrillation)  -     amiodarone (PACERONE) 200 MG tablet; Take 1 tablet by mouth Daily.  Dispense: 90 tablet; Refill: 3  -     metoprolol tartrate (LOPRESSOR) 25 MG tablet; Take 1 tablet by mouth Daily.  Dispense: 90 tablet; Refill: 3  -     TSH; Future    9. Palpitations  -     TSH; Future  -     Magnesium; Future       At baseline her heart rate is stable.  Her blood pressure is normal.  Her EKG showed sinus rhythm with first-degree AV block and left bundle branch block.  QTc is 465 ms.  Her clinical examination reveals a BMI of 25.  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    Regarding her mitral valve insufficiency which is progressively got worse developed severe MR and ended up with replacement with a mechanical valve.  She may need to be on Coumadin rest of her life.  At this time we will check a blood count to make sure she is not anemic.    Regarding her hypertension, it seems to be controlled with low-dose of amlodipine and Lopressor.  Continue the same and have the electrolytes checked along with renal function    Regarding her shortness of breath which is progressively getting better, she takes Lasix as needed.  Continue the same with potassium supplements    Regarding her chronic fatigue which could be secondary to the surgery, she is strongly advised to start cardiac rehab    Regarding hypercholesterolemia, she is on Crestor at 20 mg.  I like to check the lipid profile and LFT.  If it is well-controlled then we can reduce the dose    Regarding a bundle branch block, she still has fairly normal EF we will continue to monitor    Regarding the PAF, continue amiodarone for the next 3 to 4 months and then gradually stop it.  Meanwhile check the TSH level.    Regarding the palpitation which is much better continue the low-dose of beta-blockers and  amiodarone    Regarding advanced directive, we did talk to her about it in the past.                          Electronically signed by Jamison Judge MD February 7, 2024 13:56 EST

## 2024-02-07 NOTE — LETTER
February 7, 2024       No Recipients    Patient: Sheryl Bryan   YOB: 1945   Date of Visit: 2/7/2024     Dear Rodney Swift MD:       Thank you for referring Sheryl Bryan to me for evaluation. Below are the relevant portions of my assessment and plan of care.    If you have questions, please do not hesitate to call me. I look forward to following Sheryl along with you.         Sincerely,        Jamison Judge MD        CC:   No Recipients    Jamison Judge MD  02/07/24 1402  Sign when Signing Visit  Chief Complaint   Patient presents with   • Follow-up     Went to ER in Oct with SOB, cardiac workup showed Mod -Sev MR. MVR on 11/17/23, Dr Emmanuel at . Pt reports having had complications post op and was there for several days then went to [facility] for 8 days.  All records in [place]. Still having some fatigue but overall doing well.    • Labs     Pt reports her INR is being checked by home health and followed by Dr Swift. No other labs since discharge.    • Med Refill     Refills needed on cardiac meds, 90 days to [facility].        CARDIAC COMPLAINTS  fatigue        Subjective  Sheryl Bryan is a 78 y.o. female came in today for her hospital follow-up visit.  She has history of hypertension, hypercholesterolemia who also had left bundle branch block and an abnormal stress test.  She was seen in October after being in the hospital for shortness of breath and found to have significant mitral regurgitation.  The film was reviewed by Dr. Emmanuel and underwent mechanical mitral valve replacement in November.  She is now have the INR checked by home health.  Her main symptom at this time is tiredness and fatigue.  She had to start the cardiac rehab.  She denies having any chest pain.  Her shortness of breath is improved.  Apparently her postop was complicated with PAF and she is taking low-dose of amiodarone.              Cardiac History  Past Surgical History:   Procedure Laterality Date   • CARDIAC  CATHETERIZATION  10/05/2023    Dr. Kulkarni- 40% LAD. DFR 0.93   • CARDIOVASCULAR STRESS TEST  03/31/2015    4 Min, 82% THR. BP-- 180/80. Negative   • CARDIOVASCULAR STRESS TEST  09/12/2023    Lexiscan- EF > 70%. 169/87. LBBB. R/O Anterior Ischemia   • ECHO - CONVERTED  12/23/2014    ([facility]) EF 65%. RVSP 48 mmHg   • ECHO - CONVERTED  01/14/2020    EF 65%. LA- 4.1 Cm. Mild MR & AI. RVSP- 30 mmHg.   • ECHO - CONVERTED  09/12/2023    EF 65%. LA- 4.2. Mod-Severe MR. Mild AI. RVSP- 34 mmHg   • ECHO - CONVERTED  10/05/2023    @ [facility]. Dr. Kulkarni- EF 65%. LA- 4.0. Mod-Severe MR. Mild AI. RVSP- 47 mmHg   • MITRAL VALVE REPLACEMENT  11/17/2023    - # 27 St.Bossman Mechanical Valve   • US CAROTID UNILATERAL  09/12/2023    50-69% Both ICA       Current Outpatient Medications   Medication Sig Dispense Refill   • amiodarone (PACERONE) 200 MG tablet Take 1 tablet by mouth Daily. 90 tablet 3   • amLODIPine (NORVASC) 5 MG tablet Take 1 tablet by mouth Daily. 90 tablet 3   • aspirin 81 MG EC tablet Take 1 tablet by mouth Daily.     • Cholecalciferol (VITAMIN D) 2000 UNITS tablet Take 1 tablet by mouth Daily.     • cyclobenzaprine (FLEXERIL) 5 MG tablet Take 1 tablet by mouth Daily.     • doxepin (SINEquan) 75 MG capsule Take 1 capsule by mouth Every Night.     • furosemide (LASIX) 40 MG tablet Take 1 tablet by mouth 2 (Two) Times a Day As Needed (edema). 45 tablet 3   • magnesium oxide (MAG-OX) 400 MG tablet Take 1 tablet by mouth Daily.     • metoprolol tartrate (LOPRESSOR) 25 MG tablet Take 1 tablet by mouth Daily. 90 tablet 3   • montelukast (SINGULAIR) 10 MG tablet Take 1 tablet by mouth Every Night.     • multivitamin with minerals tablet tablet Take 1 tablet by mouth Daily.     • naproxen sodium (ALEVE) 220 MG tablet Take 1 tablet by mouth every night at bedtime.     • pantoprazole (PROTONIX) 40 MG EC tablet Take 1 tablet by mouth Daily.     • PARoxetine (PAXIL) 20 MG tablet Take 1 tablet by mouth Every Morning.     •  potassium chloride (K-DUR,KLOR-CON) 20 MEQ CR tablet Take 1 tablet by mouth Daily. 90 tablet 3   • Probiotic Product (PROBIOTIC DAILY PO) Take 100 mg by mouth 2 (Two) Times a Day.     • rosuvastatin (CRESTOR) 20 MG tablet Take 1 tablet by mouth Daily. 90 tablet 3   • vitamin B-12 (CYANOCOBALAMIN) 500 MCG tablet Take 1 tablet by mouth Daily.     • warfarin (COUMADIN) 1 MG tablet PCP monitoring       No current facility-administered medications for this visit.       Allergies  :  Penicillins       Past Medical History:   Diagnosis Date   • GERD (gastroesophageal reflux disease)    • H/O colonoscopy    • History of appendectomy    • Hypertension    • Sleep apnea        Social History     Socioeconomic History   • Marital status:    Tobacco Use   • Smoking status: Former     Types: Cigarettes     Quit date:      Years since quittin.1   • Smokeless tobacco: Never   Vaping Use   • Vaping Use: Never used   Substance and Sexual Activity   • Alcohol use: Yes     Comment: socially- occasional   • Drug use: No       Family History   Problem Relation Age of Onset   • Cancer Mother         leukemia   • Aneurysm Father    • Stroke Maternal Aunt    • Diabetes Maternal Grandmother        Review of Systems   Constitutional: Positive for malaise/fatigue. Negative for decreased appetite.   HENT:  Negative for congestion and sore throat.    Eyes:  Negative for blurred vision, double vision and visual disturbance.   Cardiovascular:  Negative for chest pain and leg swelling.   Respiratory:  Positive for shortness of breath. Negative for snoring.    Endocrine: Negative for cold intolerance and heat intolerance.   Hematologic/Lymphatic: Negative for adenopathy. Does not bruise/bleed easily.   Skin:  Negative for itching, nail changes and skin cancer.   Musculoskeletal:  Negative for arthritis and myalgias.   Gastrointestinal:  Negative for abdominal pain, dysphagia and heartburn.   Genitourinary:  Negative for bladder  "incontinence and frequency.   Neurological:  Positive for dizziness. Negative for seizures and vertigo.   Psychiatric/Behavioral:  Negative for altered mental status.    Allergic/Immunologic: Negative for environmental allergies and hives.     Diabetes- No  Thyroid- normal    Objective    /74   Pulse 66   Ht 162.6 cm (64\")   Wt 67.1 kg (148 lb)   BMI 25.40 kg/m²     Vitals and nursing note reviewed.   Constitutional:       Appearance: Healthy appearance. Not in distress.   Eyes:      Conjunctiva/sclera: Conjunctivae normal.      Pupils: Pupils are equal, round, and reactive to light.   HENT:      Head: Normocephalic.   Pulmonary:      Effort: Pulmonary effort is normal.      Breath sounds: Normal breath sounds.   Cardiovascular:      PMI at left midclavicular line. Normal rate. Regular rhythm. mechanical S1.      Murmurs: There is a grade 3/6 high frequency blowing holosystolic murmur at the apex.   Abdominal:      General: Bowel sounds are normal.      Palpations: Abdomen is soft.   Musculoskeletal: Normal range of motion.      Cervical back: Normal range of motion and neck supple. Skin:     General: Skin is warm and dry.   Neurological:      Mental Status: Alert, oriented to person, place, and time and oriented to person, place and time.     ECG 12 Lead    Date/Time: 2/7/2024 2:01 PM  Performed by: Jamison Judge MD    Authorized by: Jamison Judge MD  Comparison: compared with previous ECG from 8/15/2023  Similar to previous ECG  Rhythm: sinus rhythm  Rate: normal  Conduction: left bundle branch block and 1st degree AV block  QRS axis: normal    Clinical impression: abnormal EKG              @ASSESSMENT/PLAN@        Diagnoses and all orders for this visit:    1. Mitral valve insufficiency, unspecified etiology (Primary)  -     CBC & Differential; Future    2. Primary hypertension  -     amLODIPine (NORVASC) 5 MG tablet; Take 1 tablet by mouth Daily.  Dispense: 90 tablet; Refill: 3  -     " metoprolol tartrate (LOPRESSOR) 25 MG tablet; Take 1 tablet by mouth Daily.  Dispense: 90 tablet; Refill: 3  -     Comprehensive Metabolic Panel; Future    3. Shortness of breath  -     furosemide (LASIX) 40 MG tablet; Take 1 tablet by mouth 2 (Two) Times a Day As Needed (edema).  Dispense: 45 tablet; Refill: 3  -     potassium chloride (K-DUR,KLOR-CON) 20 MEQ CR tablet; Take 1 tablet by mouth Daily.  Dispense: 90 tablet; Refill: 3    4. Chronic fatigue    5. Hypercholesteremia  -     rosuvastatin (CRESTOR) 20 MG tablet; Take 1 tablet by mouth Daily.  Dispense: 90 tablet; Refill: 3  -     Lipid Panel; Future    6. LBBB (left bundle branch block)    7. Heart murmur    8. PAF (paroxysmal atrial fibrillation)  -     amiodarone (PACERONE) 200 MG tablet; Take 1 tablet by mouth Daily.  Dispense: 90 tablet; Refill: 3  -     metoprolol tartrate (LOPRESSOR) 25 MG tablet; Take 1 tablet by mouth Daily.  Dispense: 90 tablet; Refill: 3  -     TSH; Future    9. Palpitations  -     TSH; Future  -     Magnesium; Future       At baseline her heart rate is stable.  Her blood pressure is normal.  Her EKG showed sinus rhythm with first-degree AV block and left bundle branch block.  QTc is 465 ms.  Her clinical examination reveals a BMI of 25.  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    Regarding her mitral valve insufficiency which is progressively got worse developed severe MR and ended up with replacement with a mechanical valve.  She may need to be on Coumadin rest of her life.  At this time we will check a blood count to make sure she is not anemic.    Regarding her hypertension, it seems to be controlled with low-dose of amlodipine and Lopressor.  Continue the same and have the electrolytes checked along with renal function    Regarding her shortness of breath which is progressively getting better, she takes Lasix as needed.  Continue the same with potassium supplements    Regarding her chronic fatigue which could be  secondary to the surgery, she is strongly advised to start cardiac rehab    Regarding hypercholesterolemia, she is on Crestor at 20 mg.  I like to check the lipid profile and LFT.  If it is well-controlled then we can reduce the dose    Regarding a bundle branch block, she still has fairly normal EF we will continue to monitor    Regarding the PAF, continue amiodarone for the next 3 to 4 months and then gradually stop it.  Meanwhile check the TSH level.    Regarding the palpitation which is much better continue the low-dose of beta-blockers and amiodarone    Regarding advanced directive, we did talk to her about it in the past.                          Electronically signed by Jamison Judge MD February 7, 2024 13:56 EST

## 2024-02-08 ENCOUNTER — TELEPHONE (OUTPATIENT)
Dept: CARDIOLOGY | Facility: CLINIC | Age: 79
End: 2024-02-08
Payer: MEDICARE

## 2024-02-26 ENCOUNTER — TELEPHONE (OUTPATIENT)
Dept: CARDIOLOGY | Facility: CLINIC | Age: 79
End: 2024-02-26
Payer: MEDICARE

## 2024-02-26 DIAGNOSIS — R60.0 BILATERAL LEG EDEMA: ICD-10-CM

## 2024-02-26 DIAGNOSIS — R06.02 SHORTNESS OF BREATH: Primary | ICD-10-CM

## 2024-02-26 NOTE — TELEPHONE ENCOUNTER
Caller: Sheryl Bryan    Relationship to patient: Self    Best call back number: [phone]    Chief complaint:     Type of visit: FOLLOW UP    Requested date: ASAP     If rescheduling, when is the original appointment: 8.17.2024     Additional notes:PATIENT IS REQUESTING TO SEE DR. ALBIN CARDOSO; ADVISING SHE HAD OPEN HEART SURGERY IN NOVEMBER AND HAS DONE FINE UP UNTIL NOW; SHE IS SWELLING IN HER FEET AND LEGS AND ON EXERTION SHE IS LIGHT HEADED, HAS BEEN LIKE THIS SINCE THURSDAY-SHE HAS BEEN TAKING AN EXTRA WATER PILL TO NO AVAIL. PLEASE CALL THE PATIENT ASAP TO RESCHEDULE

## 2024-02-26 NOTE — TELEPHONE ENCOUNTER
She need to have labs done.  This was ordered at the time of visit.  Need also BNP and an echocardiogram

## 2024-02-26 NOTE — TELEPHONE ENCOUNTER
Spoke with patient.  Having more SOB recently with increased edema in LE.  Reports O2 dropping to 82 % on her pulse ox last week. Used her O2 prn which did help but concerned why she is having problems. Taking her Lasix 40 mg once to twice a day but edema is not really improving. 's-130's /90's pulse in the 70's.       Current meds include:    Metoprolol tart 25 mg daily  Lasix 40 mg BID PRN  Potassium 20 meq daily  Amlodipine 5 mg daily  Amiodarone 200 mg daily  Mag ox 400 mg daily

## 2024-02-27 DIAGNOSIS — R60.0 BILATERAL LEG EDEMA: ICD-10-CM

## 2024-02-27 DIAGNOSIS — R06.02 SHORTNESS OF BREATH: Primary | ICD-10-CM

## 2024-02-27 NOTE — TELEPHONE ENCOUNTER
Pt aware of recommendations. I have added a BNP order and flipped the other labs orders to internal. Please add order for Echo at the [facility].

## 2024-02-28 NOTE — TELEPHONE ENCOUNTER
Pt was aware to expect a call from scheduling and to advise them she will need labs completed also.

## 2024-03-05 ENCOUNTER — HOSPITAL ENCOUNTER (OUTPATIENT)
Dept: CARDIOLOGY | Facility: HOSPITAL | Age: 79
Discharge: HOME | End: 2024-03-05
Payer: MEDICARE

## 2024-03-05 ENCOUNTER — LAB (OUTPATIENT)
Dept: LAB | Facility: HOSPITAL | Age: 79
End: 2024-03-05
Payer: MEDICARE

## 2024-03-05 VITALS — HEIGHT: 64 IN | BODY MASS INDEX: 25.25 KG/M2 | WEIGHT: 147.93 LBS

## 2024-03-05 DIAGNOSIS — I10 PRIMARY HYPERTENSION: ICD-10-CM

## 2024-03-05 DIAGNOSIS — R00.2 PALPITATIONS: ICD-10-CM

## 2024-03-05 DIAGNOSIS — I34.0 MITRAL VALVE INSUFFICIENCY, UNSPECIFIED ETIOLOGY: ICD-10-CM

## 2024-03-05 DIAGNOSIS — R06.02 SHORTNESS OF BREATH: ICD-10-CM

## 2024-03-05 DIAGNOSIS — E78.00 HYPERCHOLESTEREMIA: ICD-10-CM

## 2024-03-05 DIAGNOSIS — R60.0 BILATERAL LEG EDEMA: ICD-10-CM

## 2024-03-05 DIAGNOSIS — I48.0 PAF (PAROXYSMAL ATRIAL FIBRILLATION): ICD-10-CM

## 2024-03-05 LAB
ALBUMIN SERPL-MCNC: 4.1 G/DL (ref 3.5–5.2)
ALBUMIN/GLOB SERPL: 1.3 G/DL
ALP SERPL-CCNC: 107 U/L (ref 39–117)
ALT SERPL W P-5'-P-CCNC: 16 U/L (ref 1–33)
ANION GAP SERPL CALCULATED.3IONS-SCNC: 12.7 MMOL/L (ref 5–15)
AORTIC DIMENSIONLESS INDEX: 0.89 (DI)
AST SERPL-CCNC: 32 U/L (ref 1–32)
BASOPHILS # BLD AUTO: 0.07 10*3/MM3 (ref 0–0.2)
BASOPHILS NFR BLD AUTO: 1.1 % (ref 0–1.5)
BH CV ECHO MEAS - ACS: 1.36 CM
BH CV ECHO MEAS - AO MAX PG: 3.4 MMHG
BH CV ECHO MEAS - AO MEAN PG: 2.07 MMHG
BH CV ECHO MEAS - AO ROOT DIAM: 2.8 CM
BH CV ECHO MEAS - AO V2 MAX: 92.7 CM/SEC
BH CV ECHO MEAS - AO V2 VTI: 19.8 CM
BH CV ECHO MEAS - EDV(CUBED): 81.5 ML
BH CV ECHO MEAS - EF(MOD-BP): 55 %
BH CV ECHO MEAS - ESV(CUBED): 29.8 ML
BH CV ECHO MEAS - FS: 28.5 %
BH CV ECHO MEAS - IVS/LVPW: 0.98 CM
BH CV ECHO MEAS - IVSD: 1.17 CM
BH CV ECHO MEAS - LA DIMENSION: 4.4 CM
BH CV ECHO MEAS - LAT PEAK E' VEL: 7.2 CM/SEC
BH CV ECHO MEAS - LV MASS(C)D: 182.9 GRAMS
BH CV ECHO MEAS - LV MAX PG: 3.2 MMHG
BH CV ECHO MEAS - LV MEAN PG: 1.29 MMHG
BH CV ECHO MEAS - LV V1 MAX: 89 CM/SEC
BH CV ECHO MEAS - LV V1 VTI: 17.7 CM
BH CV ECHO MEAS - LVIDD: 4.3 CM
BH CV ECHO MEAS - LVIDS: 3.1 CM
BH CV ECHO MEAS - LVPWD: 1.19 CM
BH CV ECHO MEAS - MED PEAK E' VEL: 3.8 CM/SEC
BH CV ECHO MEAS - MV A MAX VEL: 104.9 CM/SEC
BH CV ECHO MEAS - MV DEC SLOPE: 582.3 CM/SEC2
BH CV ECHO MEAS - MV DEC TIME: 0.22 SEC
BH CV ECHO MEAS - MV E MAX VEL: 97 CM/SEC
BH CV ECHO MEAS - MV E/A: 0.92
BH CV ECHO MEAS - MV MAX PG: 7 MMHG
BH CV ECHO MEAS - MV MEAN PG: 3.1 MMHG
BH CV ECHO MEAS - MV P1/2T: 71.9 MSEC
BH CV ECHO MEAS - MV V2 VTI: 39.5 CM
BH CV ECHO MEAS - MVA(P1/2T): 3.1 CM2
BH CV ECHO MEAS - PA V2 MAX: 89.8 CM/SEC
BH CV ECHO MEAS - PI END-D VEL: 111.1 CM/SEC
BH CV ECHO MEAS - RAP SYSTOLE: 8 MMHG
BH CV ECHO MEAS - RV MAX PG: 1.06 MMHG
BH CV ECHO MEAS - RV V1 MAX: 51.5 CM/SEC
BH CV ECHO MEAS - RV V1 VTI: 10.7 CM
BH CV ECHO MEAS - RVDD: 3.1 CM
BH CV ECHO MEAS - RVSP: 39 MMHG
BH CV ECHO MEAS - TAPSE (>1.6): 1.43 CM
BH CV ECHO MEAS - TR MAX PG: 31 MMHG
BH CV ECHO MEAS - TR MAX VEL: 278.2 CM/SEC
BH CV ECHO MEASUREMENTS AVERAGE E/E' RATIO: 17.64
BH CV XLRA - TDI S': 7 CM/SEC
BILIRUB SERPL-MCNC: 0.5 MG/DL (ref 0–1.2)
BUN SERPL-MCNC: 19 MG/DL (ref 8–23)
BUN/CREAT SERPL: 20 (ref 7–25)
CALCIUM SPEC-SCNC: 9.2 MG/DL (ref 8.6–10.5)
CHLORIDE SERPL-SCNC: 101 MMOL/L (ref 98–107)
CHOLEST SERPL-MCNC: 153 MG/DL (ref 0–200)
CO2 SERPL-SCNC: 26.3 MMOL/L (ref 22–29)
CREAT SERPL-MCNC: 0.95 MG/DL (ref 0.57–1)
DEPRECATED RDW RBC AUTO: 51.3 FL (ref 37–54)
EGFRCR SERPLBLD CKD-EPI 2021: 61.1 ML/MIN/1.73
EOSINOPHIL # BLD AUTO: 0.34 10*3/MM3 (ref 0–0.4)
EOSINOPHIL NFR BLD AUTO: 5.2 % (ref 0.3–6.2)
ERYTHROCYTE [DISTWIDTH] IN BLOOD BY AUTOMATED COUNT: 14.5 % (ref 12.3–15.4)
GLOBULIN UR ELPH-MCNC: 3.1 GM/DL
GLUCOSE SERPL-MCNC: 104 MG/DL (ref 65–99)
HCT VFR BLD AUTO: 35.6 % (ref 34–46.6)
HDLC SERPL-MCNC: 52 MG/DL (ref 40–60)
HGB BLD-MCNC: 11.4 G/DL (ref 12–15.9)
IMM GRANULOCYTES # BLD AUTO: 0.02 10*3/MM3 (ref 0–0.05)
IMM GRANULOCYTES NFR BLD AUTO: 0.3 % (ref 0–0.5)
LDLC SERPL CALC-MCNC: 73 MG/DL (ref 0–100)
LDLC/HDLC SERPL: 1.31 {RATIO}
LYMPHOCYTES # BLD AUTO: 1.52 10*3/MM3 (ref 0.7–3.1)
LYMPHOCYTES NFR BLD AUTO: 23.1 % (ref 19.6–45.3)
MAGNESIUM SERPL-MCNC: 2.2 MG/DL (ref 1.6–2.4)
MCH RBC QN AUTO: 30.7 PG (ref 26.6–33)
MCHC RBC AUTO-ENTMCNC: 32 G/DL (ref 31.5–35.7)
MCV RBC AUTO: 96 FL (ref 79–97)
MONOCYTES # BLD AUTO: 0.61 10*3/MM3 (ref 0.1–0.9)
MONOCYTES NFR BLD AUTO: 9.3 % (ref 5–12)
NEUTROPHILS NFR BLD AUTO: 4.01 10*3/MM3 (ref 1.7–7)
NEUTROPHILS NFR BLD AUTO: 61 % (ref 42.7–76)
NRBC BLD AUTO-RTO: 0 /100 WBC (ref 0–0.2)
NT-PROBNP SERPL-MCNC: 235.9 PG/ML (ref 0–1800)
PLATELET # BLD AUTO: 311 10*3/MM3 (ref 140–450)
PMV BLD AUTO: 10 FL (ref 6–12)
POTASSIUM SERPL-SCNC: 3.9 MMOL/L (ref 3.5–5.2)
PROT SERPL-MCNC: 7.2 G/DL (ref 6–8.5)
RBC # BLD AUTO: 3.71 10*6/MM3 (ref 3.77–5.28)
SINUS: 2.7 CM
SODIUM SERPL-SCNC: 140 MMOL/L (ref 136–145)
TRIGL SERPL-MCNC: 165 MG/DL (ref 0–150)
TSH SERPL DL<=0.05 MIU/L-ACNC: 3.98 UIU/ML (ref 0.27–4.2)
VLDLC SERPL-MCNC: 28 MG/DL (ref 5–40)
WBC NRBC COR # BLD AUTO: 6.57 10*3/MM3 (ref 3.4–10.8)

## 2024-03-05 PROCEDURE — 83880 ASSAY OF NATRIURETIC PEPTIDE: CPT

## 2024-03-05 PROCEDURE — 85025 COMPLETE CBC W/AUTO DIFF WBC: CPT

## 2024-03-05 PROCEDURE — 80061 LIPID PANEL: CPT

## 2024-03-05 PROCEDURE — 83735 ASSAY OF MAGNESIUM: CPT

## 2024-03-05 PROCEDURE — 84443 ASSAY THYROID STIM HORMONE: CPT

## 2024-03-05 PROCEDURE — 80053 COMPREHEN METABOLIC PANEL: CPT

## 2024-03-05 PROCEDURE — 93306 TTE W/DOPPLER COMPLETE: CPT

## 2024-03-05 PROCEDURE — 93306 TTE W/DOPPLER COMPLETE: CPT | Performed by: INTERNAL MEDICINE

## 2024-03-05 PROCEDURE — 36415 COLL VENOUS BLD VENIPUNCTURE: CPT

## 2024-03-11 ENCOUNTER — TELEPHONE (OUTPATIENT)
Dept: CARDIOLOGY | Facility: CLINIC | Age: 79
End: 2024-03-11
Payer: MEDICARE

## 2024-03-11 NOTE — TELEPHONE ENCOUNTER
Pt called, Saw Dr Bird, she was concerned about her edema in lower extremities. Pt told her she had just had labs and echo. Pt states she has taken her lasix 40 mg three times in one day just to see if that would help which it didn't. /70 pulse 78, could it be the amlodipine?     Current medications include:     Amlodipine 5 mg daily  Lasix 40 mg BID prn  Amiodarone 200 mg daily  Metoprolol tart 25 mg daily  Potassium 20 mg daily  Aleve 220 mg nightly

## 2024-03-19 ENCOUNTER — TELEPHONE (OUTPATIENT)
Dept: CARDIOLOGY | Facility: CLINIC | Age: 79
End: 2024-03-19
Payer: MEDICARE

## 2024-03-19 NOTE — TELEPHONE ENCOUNTER
Pt had LM asking to be call back, no details given. I called pt back, no answer. LM  asking her to return my call.

## 2024-03-19 NOTE — TELEPHONE ENCOUNTER
Patient called back again stating that the swelling is not any better after going off of amlodipine. Patient states that her BP has been staying around 120/70.

## 2024-03-25 DIAGNOSIS — R60.0 BILATERAL LEG EDEMA: Primary | ICD-10-CM

## 2024-03-25 NOTE — TELEPHONE ENCOUNTER
Patient was made aware to get compression socks, she was made aware that you can find them in a lot of pharmacies. Patient states that she is agreeable to have venous reflux study if you will place order.

## 2024-03-28 NOTE — TELEPHONE ENCOUNTER
I called patient's son, Kandis.  Kandis gave me patient's new landline home phone number. [phone].  I will add phone number to chart.    Patient aware of venous reflux test on 4/8/24 at 12:00 pm at [facility].

## 2024-05-06 ENCOUNTER — TELEPHONE (OUTPATIENT)
Dept: CARDIOLOGY | Facility: CLINIC | Age: 79
End: 2024-05-06

## 2024-05-06 NOTE — TELEPHONE ENCOUNTER
ER note, EKG, CXR, and labs scanned in.     Her regular follow up is 8/7/24.  Keep follow up or do you want patient scheduled sooner?

## 2024-05-06 NOTE — TELEPHONE ENCOUNTER
Caller: Sheryl Bryan    Relationship to patient: Self    Best call back number: [phone]    Chief complaint: WAS RECENTLY AT [facility] FOR SOB, PT HAS CHF AND HAD EDEMA IN BOTH LEGS AND FEET. [facility] ED DOCTOR ADVISED PT TO TAKE AN EXTRA LASIX, ONE IN MORNING, ONE AT NIGHT. PT REPORTS IT HAS HELPED BRING THE SWELLING DOWN. THEY HAVE REQUESTED SHE GET AN APPT WITH DR. ALEXANDRA CARDOSO FOR ROUTINE CHECK UP POST ED VISIT     Type of visit: FOLLOW UP    Requested date: ASAP     If rescheduling, when is the original appointment: N/A     Additional notes:HAS A FOLLOW UP ON 08.07.24

## 2024-05-07 NOTE — TELEPHONE ENCOUNTER
Appointment moved. Called to let patient know.  No answer. Left Voicemail and mailed appointment letter.

## 2024-07-16 ENCOUNTER — TELEPHONE (OUTPATIENT)
Dept: CARDIOLOGY | Facility: CLINIC | Age: 79
End: 2024-07-16
Payer: MEDICARE

## 2024-07-16 NOTE — TELEPHONE ENCOUNTER
Caller: Sheryl Bryan     Relationship: SELF     Best call back number: [phone]    What is your medical concern? SWELLING ALL OVER BODY... LEGS TWICE NORMAL SIZE    How long has this issue been going on? 8-9 DAYS    Is your provider already aware of this issue? NO    Have you been treated for this issue?  PATIENT WAS AT THE [facility] 7-12-24-7-14-24. PATIENT CAME HOME 7-14-24 AND SWELLING BEGAN AGAIN ON 7-15-24. PATIENT WOULD LIKE TO SEE DR. TALAMANTES. PLEASE REACH OUT.

## 2024-07-16 NOTE — TELEPHONE ENCOUNTER
Called patient. She has had another admission. Records in chart for you to review.  Reports having had a 5 lb weight gain since yesterday. Still having SOB.  Edema in Lower extremities. /67. Pulse ?    Discharge meds include:    Lasix 40 mg BID  Potassium 20 meq daily  Metoprolol tart increased to 25 mg BID  Losartan 50 mg daily    I moved her follow up appointment up to Monday at 9:30 with you. Please review records and give recommendations.

## 2024-07-17 NOTE — TELEPHONE ENCOUNTER
Pt aware to continue current regimen and make sure and keep appointment on Monday to discuss possible cardioMEMS. Aware to go to ER if symptoms worsen. States she is doing ok.

## 2024-07-22 ENCOUNTER — OFFICE VISIT (OUTPATIENT)
Dept: CARDIOLOGY | Facility: CLINIC | Age: 79
End: 2024-07-22
Payer: MEDICARE

## 2024-07-22 VITALS
HEIGHT: 64 IN | SYSTOLIC BLOOD PRESSURE: 96 MMHG | HEART RATE: 73 BPM | WEIGHT: 153 LBS | DIASTOLIC BLOOD PRESSURE: 50 MMHG | BODY MASS INDEX: 26.12 KG/M2

## 2024-07-22 DIAGNOSIS — G47.30 SLEEP APNEA IN ADULT: ICD-10-CM

## 2024-07-22 DIAGNOSIS — I48.0 PAF (PAROXYSMAL ATRIAL FIBRILLATION): Primary | ICD-10-CM

## 2024-07-22 DIAGNOSIS — I50.32 CHRONIC DIASTOLIC CHF (CONGESTIVE HEART FAILURE), NYHA CLASS 3: ICD-10-CM

## 2024-07-22 DIAGNOSIS — E78.00 HYPERCHOLESTEREMIA: ICD-10-CM

## 2024-07-22 DIAGNOSIS — R06.02 SHORTNESS OF BREATH: ICD-10-CM

## 2024-07-22 DIAGNOSIS — I10 PRIMARY HYPERTENSION: ICD-10-CM

## 2024-07-22 PROCEDURE — 1159F MED LIST DOCD IN RCRD: CPT | Performed by: INTERNAL MEDICINE

## 2024-07-22 PROCEDURE — 3074F SYST BP LT 130 MM HG: CPT | Performed by: INTERNAL MEDICINE

## 2024-07-22 PROCEDURE — 1160F RVW MEDS BY RX/DR IN RCRD: CPT | Performed by: INTERNAL MEDICINE

## 2024-07-22 PROCEDURE — 93000 ELECTROCARDIOGRAM COMPLETE: CPT | Performed by: INTERNAL MEDICINE

## 2024-07-22 PROCEDURE — 99214 OFFICE O/P EST MOD 30 MIN: CPT | Performed by: INTERNAL MEDICINE

## 2024-07-22 PROCEDURE — 3078F DIAST BP <80 MM HG: CPT | Performed by: INTERNAL MEDICINE

## 2024-07-22 RX ORDER — METOLAZONE 2.5 MG/1
2.5 TABLET ORAL AS NEEDED
Qty: 90 TABLET | Refills: 3 | Status: SHIPPED | OUTPATIENT
Start: 2024-07-22

## 2024-07-22 RX ORDER — LOSARTAN POTASSIUM 50 MG/1
50 TABLET ORAL DAILY
COMMUNITY
End: 2024-07-22 | Stop reason: SDUPTHER

## 2024-07-22 RX ORDER — FUROSEMIDE 40 MG/1
40 TABLET ORAL 2 TIMES DAILY
Qty: 180 TABLET | Refills: 3 | Status: SHIPPED | OUTPATIENT
Start: 2024-07-22

## 2024-07-22 RX ORDER — ROSUVASTATIN CALCIUM 20 MG/1
20 TABLET, COATED ORAL DAILY
Qty: 90 TABLET | Refills: 3 | Status: SHIPPED | OUTPATIENT
Start: 2024-07-22

## 2024-07-22 RX ORDER — ECHINACEA PURPUREA EXTRACT 125 MG
1 TABLET ORAL AS NEEDED
COMMUNITY

## 2024-07-22 RX ORDER — FUROSEMIDE 40 MG/1
40 TABLET ORAL 2 TIMES DAILY
COMMUNITY
End: 2024-07-22 | Stop reason: SDUPTHER

## 2024-07-22 RX ORDER — POTASSIUM CHLORIDE 20 MEQ/1
20 TABLET, EXTENDED RELEASE ORAL DAILY
Qty: 90 TABLET | Refills: 3 | Status: SHIPPED | OUTPATIENT
Start: 2024-07-22

## 2024-07-22 RX ORDER — LOSARTAN POTASSIUM 50 MG/1
50 TABLET ORAL DAILY
Qty: 90 TABLET | Refills: 3 | Status: SHIPPED | OUTPATIENT
Start: 2024-07-22

## 2024-07-22 RX ORDER — ALBUTEROL SULFATE 90 UG/1
2 AEROSOL, METERED RESPIRATORY (INHALATION) EVERY 4 HOURS PRN
COMMUNITY

## 2024-07-22 RX ORDER — BENZONATATE 100 MG/1
100 CAPSULE ORAL 3 TIMES DAILY
COMMUNITY

## 2024-07-22 NOTE — LETTER
July 22, 2024       No Recipients    Patient: Sheryl Bryan   YOB: 1945   Date of Visit: 7/22/2024     Dear Nic Paul MD:       Thank you for referring Sheryl Bryan to me for evaluation. Below are the relevant portions of my assessment and plan of care.    If you have questions, please do not hesitate to call me. I look forward to following Sheryl along with you.         Sincerely,        Jamison Judge MD        CC:   No Recipients    Jamison Judge MD  07/22/24 1317  Sign when Signing Visit  Chief Complaint   Patient presents with   • Follow-up     For cardiac management, ER visit and admission for increased edema in LE, echo at last admission, all records in chart.    • Labs     Most recent labs from hospital admission is in chart, PCP checked INR last week, 2.8 to have recheck today.    • Edema     Has still been having edema in LE. SOB worse at times with increased edema. Daily weights fluctuating about 5 pounds over a couple day. Pt has been taking Lasix 40 mg BID with an extra 20 mg in the afternoon.    • Med Refill     Refills needed on cardiac meds. 90 days to [facility]       CARDIAC COMPLAINTS  dyspnea, fatigue, and lower extremity edema        Subjective  Sheryl Bryan is a 79 y.o. female came in today for her hospital follow-up visit.  She has history of systemic hypertension, dyslipidemia who also has history of significant MR.  She used to have chronic left bundle branch block in the past.  She has undergone mitral valve replacement with a mechanical valve last November.  She has been in the hospital multiple times secondary to heart failure and was treated with diuretics.  She apparently got admitted again recently with increasing shortness of breath progressively got worse and also developed bilateral leg swelling.  Her weights were also fluctuating.  She got admitted and was given IV diuretics after which she feels better.  She was sent home on a higher dose of  Lasix and also with Cozaar.  She came today with her daughter.  She continues to feel tired and fatigued.  Shortness of breath did not improve with the higher dose of diuretics.  She did have some labs done at the hospital and the potassium was normal and the renal function was only borderline.              Cardiac History  Past Surgical History:   Procedure Laterality Date   • CARDIAC CATHETERIZATION  10/05/2023    Dr. Kulkarni- 40% LAD. DFR 0.93   • CARDIOVASCULAR STRESS TEST  03/31/2015    4 Min, 82% THR. BP-- 180/80. Negative   • CARDIOVASCULAR STRESS TEST  09/12/2023    Lexiscan- EF > 70%. 169/87. LBBB. R/O Anterior Ischemia   • ECHO - CONVERTED  12/23/2014    ([facility]) EF 65%. RVSP 48 mmHg   • ECHO - CONVERTED  01/14/2020    EF 65%. LA- 4.1 Cm. Mild MR & AI. RVSP- 30 mmHg.   • ECHO - CONVERTED  09/12/2023    EF 65%. LA- 4.2. Mod-Severe MR. Mild AI. RVSP- 34 mmHg   • ECHO - CONVERTED  10/05/2023    @ [facility]. Dr. Kulkarni- EF 65%. LA- 4.0. Mod-Severe MR. Mild AI. RVSP- 47 mmHg   • ECHO - CONVERTED  03/05/2024    TLS. EF 60%. LA- 4.4. MVR.Trace-Mild AI. RVSP- 39 mmHg   • ECHO - CONVERTED  07/13/2024    @ [facility]. - EF 65%. LA- 5.5. RVSP- 43 mmHg   • MITRAL VALVE REPLACEMENT  11/17/2023    - # 27 [employer] Mechanical Valve   • OTHER SURGICAL HISTORY  04/08/2024    venous reflux.- Mild reflux in (L) [place] Cyst   • US CAROTID UNILATERAL  09/12/2023    50-69% Both ICA       Current Outpatient Medications   Medication Sig Dispense Refill   • albuterol sulfate  (90 Base) MCG/ACT inhaler Inhale 2 puffs Every 4 (Four) Hours As Needed for Wheezing.     • aspirin 81 MG EC tablet Take 1 tablet by mouth Daily.     • benzonatate (TESSALON) 100 MG capsule Take 1 capsule by mouth 3 (Three) Times a Day.     • Cholecalciferol (VITAMIN D) 2000 UNITS tablet Take 1 tablet by mouth Daily.     • doxepin (SINEquan) 75 MG capsule Take 1 capsule by mouth Every Night.     • furosemide (LASIX) 40 MG tablet Take 1 tablet by mouth 2  (Two) Times a Day. 180 tablet 3   • losartan (COZAAR) 50 MG tablet Take 1 tablet by mouth Daily. 90 tablet 3   • magnesium oxide (MAG-OX) 400 MG tablet Take 1 tablet by mouth Daily.     • metoprolol tartrate (LOPRESSOR) 25 MG tablet Take 1 tablet by mouth 2 (Two) Times a Day. 180 tablet 3   • montelukast (SINGULAIR) 10 MG tablet Take 1 tablet by mouth Every Night.     • multivitamin with minerals tablet tablet Take 1 tablet by mouth Daily.     • pantoprazole (PROTONIX) 40 MG EC tablet Take 1 tablet by mouth Daily.     • PARoxetine (PAXIL) 20 MG tablet Take 1 tablet by mouth Every Morning.     • Plecanatide (TRULANCE PO) Take  by mouth Daily.     • potassium chloride (KLOR-CON M20) 20 MEQ CR tablet Take 1 tablet by mouth Daily. 90 tablet 3   • Probiotic Product (PROBIOTIC DAILY PO) Take 100 mg by mouth 2 (Two) Times a Day.     • rosuvastatin (CRESTOR) 20 MG tablet Take 1 tablet by mouth Daily. 90 tablet 3   • sodium chloride 0.65 % nasal spray 1 spray into the nostril(s) as directed by provider As Needed for Congestion.     • tretinoin (RETIN-A) 0.025 % cream Apply 1 Application topically to the appropriate area as directed Every Night.     • vitamin B-12 (CYANOCOBALAMIN) 500 MCG tablet Take 1 tablet by mouth Daily.     • warfarin (COUMADIN) 1 MG tablet PCP monitoring     • cyclobenzaprine (FLEXERIL) 5 MG tablet Take 1 tablet by mouth Daily.     • furosemide (LASIX) 40 MG tablet Take 1 tablet by mouth 2 (Two) Times a Day As Needed (edema). (Patient taking differently: Take 1 tablet by mouth 2 (Two) Times a Day.) 45 tablet 3   • metOLazone (ZAROXOLYN) 2.5 MG tablet Take 1 tablet by mouth As Needed (Dyspnea). 90 tablet 3     No current facility-administered medications for this visit.       Allergies  :  Penicillins       Past Medical History:   Diagnosis Date   • GERD (gastroesophageal reflux disease)    • H/O colonoscopy    • History of appendectomy    • Hypertension    • Sleep apnea        Social History  "    Socioeconomic History   • Marital status:    Tobacco Use   • Smoking status: Former     Current packs/day: 0.00     Types: Cigarettes     Quit date:      Years since quittin.5   • Smokeless tobacco: Never   Vaping Use   • Vaping status: Never Used   Substance and Sexual Activity   • Alcohol use: Yes     Comment: socially- occasional   • Drug use: No       Family History   Problem Relation Age of Onset   • Cancer Mother         leukemia   • Aneurysm Father    • Stroke Maternal Aunt    • Diabetes Maternal Grandmother        Review of Systems   Constitutional: Positive for malaise/fatigue. Negative for decreased appetite.   HENT:  Negative for congestion and sore throat.    Eyes:  Negative for blurred vision, double vision and visual disturbance.   Cardiovascular:  Positive for dyspnea on exertion and leg swelling. Negative for chest pain.   Respiratory:  Positive for shortness of breath. Negative for snoring.    Endocrine: Negative for cold intolerance and heat intolerance.   Hematologic/Lymphatic: Negative for adenopathy. Does not bruise/bleed easily.   Skin:  Negative for itching, nail changes and skin cancer.   Musculoskeletal:  Negative for arthritis and myalgias.   Gastrointestinal:  Negative for abdominal pain, dysphagia and heartburn.   Genitourinary:  Negative for bladder incontinence and frequency.   Neurological:  Negative for dizziness, seizures and vertigo.   Psychiatric/Behavioral:  Negative for altered mental status.    Allergic/Immunologic: Negative for environmental allergies and hives.     Diabetes- No  Thyroid- normal    Objective    BP 96/50   Pulse 73   Ht 162.6 cm (64\")   Wt 69.4 kg (153 lb)   BMI 26.26 kg/m²     Vitals and nursing note reviewed.   Constitutional:       Appearance: Healthy appearance. Not in distress.   Eyes:      Conjunctiva/sclera: Conjunctivae normal.      Pupils: Pupils are equal, round, and reactive to light.   HENT:      Head: Normocephalic. "   Pulmonary:      Effort: Pulmonary effort is normal.      Breath sounds: Normal breath sounds.   Cardiovascular:      PMI at left midclavicular line. Normal rate. Regular rhythm. loud S2.    Edema:     Ankle: bilateral trace edema of the ankle.     Feet: bilateral trace edema of the feet.  Abdominal:      General: Bowel sounds are normal.      Palpations: Abdomen is soft.   Musculoskeletal: Normal range of motion.      Cervical back: Normal range of motion and neck supple. Skin:     General: Skin is warm and dry.   Neurological:      Mental Status: Alert, oriented to person, place, and time and oriented to person, place and time.     ECG 12 Lead    Date/Time: 7/22/2024 1:16 PM  Performed by: Jamison Judge MD    Authorized by: Jamison Judge MD  Comparison: compared with previous ECG from 7/12/2024  Comparison to previous ECG: No LBBB  Rhythm: sinus rhythm  Rate: normal  Q waves: V3 and V4    QRS axis: normal  Other findings: low voltage    Clinical impression: abnormal EKG              @ASSESSMENT/PLAN@        Diagnoses and all orders for this visit:    1. PAF (paroxysmal atrial fibrillation) (Primary)    2. Primary hypertension  -     metoprolol tartrate (LOPRESSOR) 25 MG tablet; Take 1 tablet by mouth 2 (Two) Times a Day.  Dispense: 180 tablet; Refill: 3  -     losartan (COZAAR) 50 MG tablet; Take 1 tablet by mouth Daily.  Dispense: 90 tablet; Refill: 3    3. Hypercholesteremia  -     rosuvastatin (CRESTOR) 20 MG tablet; Take 1 tablet by mouth Daily.  Dispense: 90 tablet; Refill: 3    4. Shortness of breath  -     furosemide (LASIX) 40 MG tablet; Take 1 tablet by mouth 2 (Two) Times a Day.  Dispense: 180 tablet; Refill: 3  -     potassium chloride (KLOR-CON M20) 20 MEQ CR tablet; Take 1 tablet by mouth Daily.  Dispense: 90 tablet; Refill: 3  -     metOLazone (ZAROXOLYN) 2.5 MG tablet; Take 1 tablet by mouth As Needed (Dyspnea).  Dispense: 90 tablet; Refill: 3    5. Chronic diastolic CHF (congestive  heart failure), NYHA class 3  -     metoprolol tartrate (LOPRESSOR) 25 MG tablet; Take 1 tablet by mouth 2 (Two) Times a Day.  Dispense: 180 tablet; Refill: 3  -     furosemide (LASIX) 40 MG tablet; Take 1 tablet by mouth 2 (Two) Times a Day.  Dispense: 180 tablet; Refill: 3  -     potassium chloride (KLOR-CON M20) 20 MEQ CR tablet; Take 1 tablet by mouth Daily.  Dispense: 90 tablet; Refill: 3  -     metOLazone (ZAROXOLYN) 2.5 MG tablet; Take 1 tablet by mouth As Needed (Dyspnea).  Dispense: 90 tablet; Refill: 3    6. Sleep apnea in adult       At baseline her heart rate is stable.  Her blood pressure is lower limit of normal.  Her EKG surprisingly showed no bundle branch block pattern at this time.  She did have a small Q waves.  Her clinical examination reveals a BMI of 26.  Her cardiovascular examination reveals slightly loud second heart sound but otherwise within normal limit.    Regarding her PAF, she is maintaining sinus.  Her LA is enlarged and she has stopped her amiodarone.  At this time we will continue Coumadin with INR around 2.5.    Regarding her hypertension, it seems to be controlled with the higher dose of Cozaar and Lopressor.  Continue the same for now recheck the electrolytes in about a month    Regarding her history of hypercholesterolemia, she is on moderate dose of Crestor.  Will continue the same and have it rechecked along with LFT    Regarding her shortness of breath, it seems to be more of a diastolic dysfunction.  At this time continue the Lasix and potassium.  I talked to her about adding metolazone only as needed.  I also had a long talk with her and the daughter about the placement of CardioMEMS.  I did give him papers to read about it.  If they decide to have the CardioMEMS placed, will stop Coumadin for about 5 days and have the procedure done.  Meanwhile continue Lasix and use metolazone as needed    Regarding sleep apnea, she does use CPAP.  Continue to monitor    Regarding the  left bundle branch block, at this time it seems to be in regular rhythm.    Regarding advanced directive, talked to her about living will and power of  and gave him booklet regarding that    I will see her back in 4 months or sooner if needed.                Electronically signed by Jamison Judge MD July 22, 2024 13:05 EDT

## 2024-07-22 NOTE — PROGRESS NOTES
Chief Complaint   Patient presents with    Follow-up     For cardiac management, ER visit and admission for increased edema in LE, echo at last admission, all records in chart.     Labs     Most recent labs from hospital admission is in chart, PCP checked INR last week, 2.8 to have recheck today.     Edema     Has still been having edema in LE. SOB worse at times with increased edema. Daily weights fluctuating about 5 pounds over a couple day. Pt has been taking Lasix 40 mg BID with an extra 20 mg in the afternoon.     Med Refill     Refills needed on cardiac meds. 90 days to [facility]       CARDIAC COMPLAINTS  dyspnea, fatigue, and lower extremity edema        Subjective   Sheryl Bryan is a 79 y.o. female came in today for her hospital follow-up visit.  She has history of systemic hypertension, dyslipidemia who also has history of significant MR.  She used to have chronic left bundle branch block in the past.  She has undergone mitral valve replacement with a mechanical valve last November.  She has been in the hospital multiple times secondary to heart failure and was treated with diuretics.  She apparently got admitted again recently with increasing shortness of breath progressively got worse and also developed bilateral leg swelling.  Her weights were also fluctuating.  She got admitted and was given IV diuretics after which she feels better.  She was sent home on a higher dose of Lasix and also with Cozaar.  She came today with her daughter.  She continues to feel tired and fatigued.  Shortness of breath did not improve with the higher dose of diuretics.  She did have some labs done at the hospital and the potassium was normal and the renal function was only borderline.              Cardiac History  Past Surgical History:   Procedure Laterality Date    CARDIAC CATHETERIZATION  10/05/2023    Dr. Kulkarni- 40% LAD. DFR 0.93    CARDIOVASCULAR STRESS TEST  03/31/2015    4 Min, 82% THR. BP-- 180/80. Negative     CARDIOVASCULAR STRESS TEST  09/12/2023    Lexiscan- EF > 70%. 169/87. LBBB. R/O Anterior Ischemia    ECHO - CONVERTED  12/23/2014    ([facility]) EF 65%. RVSP 48 mmHg    ECHO - CONVERTED  01/14/2020    EF 65%. LA- 4.1 Cm. Mild MR & AI. RVSP- 30 mmHg.    ECHO - CONVERTED  09/12/2023    EF 65%. LA- 4.2. Mod-Severe MR. Mild AI. RVSP- 34 mmHg    ECHO - CONVERTED  10/05/2023    @ [facility]. Dr. Kulkarni- EF 65%. LA- 4.0. Mod-Severe MR. Mild AI. RVSP- 47 mmHg    ECHO - CONVERTED  03/05/2024    TLS. EF 60%. LA- 4.4. MVR.Trace-Mild AI. RVSP- 39 mmHg    ECHO - CONVERTED  07/13/2024    @ [facility]. - EF 65%. LA- 5.5. RVSP- 43 mmHg    MITRAL VALVE REPLACEMENT  11/17/2023    - # 27 [employer] Mechanical Valve    OTHER SURGICAL HISTORY  04/08/2024    venous reflux.- Mild reflux in (L) [place]yst    US CAROTID UNILATERAL  09/12/2023    50-69% Both ICA       Current Outpatient Medications   Medication Sig Dispense Refill    albuterol sulfate  (90 Base) MCG/ACT inhaler Inhale 2 puffs Every 4 (Four) Hours As Needed for Wheezing.      aspirin 81 MG EC tablet Take 1 tablet by mouth Daily.      benzonatate (TESSALON) 100 MG capsule Take 1 capsule by mouth 3 (Three) Times a Day.      Cholecalciferol (VITAMIN D) 2000 UNITS tablet Take 1 tablet by mouth Daily.      doxepin (SINEquan) 75 MG capsule Take 1 capsule by mouth Every Night.      furosemide (LASIX) 40 MG tablet Take 1 tablet by mouth 2 (Two) Times a Day. 180 tablet 3    losartan (COZAAR) 50 MG tablet Take 1 tablet by mouth Daily. 90 tablet 3    magnesium oxide (MAG-OX) 400 MG tablet Take 1 tablet by mouth Daily.      metoprolol tartrate (LOPRESSOR) 25 MG tablet Take 1 tablet by mouth 2 (Two) Times a Day. 180 tablet 3    montelukast (SINGULAIR) 10 MG tablet Take 1 tablet by mouth Every Night.      multivitamin with minerals tablet tablet Take 1 tablet by mouth Daily.      pantoprazole (PROTONIX) 40 MG EC tablet Take 1 tablet by mouth Daily.      PARoxetine (PAXIL) 20 MG tablet  Take 1 tablet by mouth Every Morning.      Plecanatide (TRULANCE PO) Take  by mouth Daily.      potassium chloride (KLOR-CON M20) 20 MEQ CR tablet Take 1 tablet by mouth Daily. 90 tablet 3    Probiotic Product (PROBIOTIC DAILY PO) Take 100 mg by mouth 2 (Two) Times a Day.      rosuvastatin (CRESTOR) 20 MG tablet Take 1 tablet by mouth Daily. 90 tablet 3    sodium chloride 0.65 % nasal spray 1 spray into the nostril(s) as directed by provider As Needed for Congestion.      tretinoin (RETIN-A) 0.025 % cream Apply 1 Application topically to the appropriate area as directed Every Night.      vitamin B-12 (CYANOCOBALAMIN) 500 MCG tablet Take 1 tablet by mouth Daily.      warfarin (COUMADIN) 1 MG tablet PCP monitoring      cyclobenzaprine (FLEXERIL) 5 MG tablet Take 1 tablet by mouth Daily.      furosemide (LASIX) 40 MG tablet Take 1 tablet by mouth 2 (Two) Times a Day As Needed (edema). (Patient taking differently: Take 1 tablet by mouth 2 (Two) Times a Day.) 45 tablet 3    metOLazone (ZAROXOLYN) 2.5 MG tablet Take 1 tablet by mouth As Needed (Dyspnea). 90 tablet 3     No current facility-administered medications for this visit.       Allergies  :  Penicillins       Past Medical History:   Diagnosis Date    GERD (gastroesophageal reflux disease)     H/O colonoscopy     History of appendectomy     Hypertension     Sleep apnea        Social History     Socioeconomic History    Marital status:    Tobacco Use    Smoking status: Former     Current packs/day: 0.00     Types: Cigarettes     Quit date:      Years since quittin.5    Smokeless tobacco: Never   Vaping Use    Vaping status: Never Used   Substance and Sexual Activity    Alcohol use: Yes     Comment: socially- occasional    Drug use: No       Family History   Problem Relation Age of Onset    Cancer Mother         leukemia    Aneurysm Father     Stroke Maternal Aunt     Diabetes Maternal Grandmother        Review of Systems   Constitutional: Positive  "for malaise/fatigue. Negative for decreased appetite.   HENT:  Negative for congestion and sore throat.    Eyes:  Negative for blurred vision, double vision and visual disturbance.   Cardiovascular:  Positive for dyspnea on exertion and leg swelling. Negative for chest pain.   Respiratory:  Positive for shortness of breath. Negative for snoring.    Endocrine: Negative for cold intolerance and heat intolerance.   Hematologic/Lymphatic: Negative for adenopathy. Does not bruise/bleed easily.   Skin:  Negative for itching, nail changes and skin cancer.   Musculoskeletal:  Negative for arthritis and myalgias.   Gastrointestinal:  Negative for abdominal pain, dysphagia and heartburn.   Genitourinary:  Negative for bladder incontinence and frequency.   Neurological:  Negative for dizziness, seizures and vertigo.   Psychiatric/Behavioral:  Negative for altered mental status.    Allergic/Immunologic: Negative for environmental allergies and hives.     Diabetes- No  Thyroid- normal    Objective     BP 96/50   Pulse 73   Ht 162.6 cm (64\")   Wt 69.4 kg (153 lb)   BMI 26.26 kg/m²     Vitals and nursing note reviewed.   Constitutional:       Appearance: Healthy appearance. Not in distress.   Eyes:      Conjunctiva/sclera: Conjunctivae normal.      Pupils: Pupils are equal, round, and reactive to light.   HENT:      Head: Normocephalic.   Pulmonary:      Effort: Pulmonary effort is normal.      Breath sounds: Normal breath sounds.   Cardiovascular:      PMI at left midclavicular line. Normal rate. Regular rhythm. loud S2.    Edema:     Ankle: bilateral trace edema of the ankle.     Feet: bilateral trace edema of the feet.  Abdominal:      General: Bowel sounds are normal.      Palpations: Abdomen is soft.   Musculoskeletal: Normal range of motion.      Cervical back: Normal range of motion and neck supple. Skin:     General: Skin is warm and dry.   Neurological:      Mental Status: Alert, oriented to person, place, and time " and oriented to person, place and time.     ECG 12 Lead    Date/Time: 7/22/2024 1:16 PM  Performed by: Jamison Judge MD    Authorized by: Jamison Judge MD  Comparison: compared with previous ECG from 7/12/2024  Comparison to previous ECG: No LBBB  Rhythm: sinus rhythm  Rate: normal  Q waves: V3 and V4    QRS axis: normal  Other findings: low voltage    Clinical impression: abnormal EKG              @ASSESSMENT/PLAN@        Diagnoses and all orders for this visit:    1. PAF (paroxysmal atrial fibrillation) (Primary)    2. Primary hypertension  -     metoprolol tartrate (LOPRESSOR) 25 MG tablet; Take 1 tablet by mouth 2 (Two) Times a Day.  Dispense: 180 tablet; Refill: 3  -     losartan (COZAAR) 50 MG tablet; Take 1 tablet by mouth Daily.  Dispense: 90 tablet; Refill: 3    3. Hypercholesteremia  -     rosuvastatin (CRESTOR) 20 MG tablet; Take 1 tablet by mouth Daily.  Dispense: 90 tablet; Refill: 3    4. Shortness of breath  -     furosemide (LASIX) 40 MG tablet; Take 1 tablet by mouth 2 (Two) Times a Day.  Dispense: 180 tablet; Refill: 3  -     potassium chloride (KLOR-CON M20) 20 MEQ CR tablet; Take 1 tablet by mouth Daily.  Dispense: 90 tablet; Refill: 3  -     metOLazone (ZAROXOLYN) 2.5 MG tablet; Take 1 tablet by mouth As Needed (Dyspnea).  Dispense: 90 tablet; Refill: 3    5. Chronic diastolic CHF (congestive heart failure), NYHA class 3  -     metoprolol tartrate (LOPRESSOR) 25 MG tablet; Take 1 tablet by mouth 2 (Two) Times a Day.  Dispense: 180 tablet; Refill: 3  -     furosemide (LASIX) 40 MG tablet; Take 1 tablet by mouth 2 (Two) Times a Day.  Dispense: 180 tablet; Refill: 3  -     potassium chloride (KLOR-CON M20) 20 MEQ CR tablet; Take 1 tablet by mouth Daily.  Dispense: 90 tablet; Refill: 3  -     metOLazone (ZAROXOLYN) 2.5 MG tablet; Take 1 tablet by mouth As Needed (Dyspnea).  Dispense: 90 tablet; Refill: 3    6. Sleep apnea in adult       At baseline her heart rate is stable.  Her blood  pressure is lower limit of normal.  Her EKG surprisingly showed no bundle branch block pattern at this time.  She did have a small Q waves.  Her clinical examination reveals a BMI of 26.  Her cardiovascular examination reveals slightly loud second heart sound but otherwise within normal limit.    Regarding her PAF, she is maintaining sinus.  Her LA is enlarged and she has stopped her amiodarone.  At this time we will continue Coumadin with INR around 2.5.    Regarding her hypertension, it seems to be controlled with the higher dose of Cozaar and Lopressor.  Continue the same for now recheck the electrolytes in about a month    Regarding her history of hypercholesterolemia, she is on moderate dose of Crestor.  Will continue the same and have it rechecked along with LFT    Regarding her shortness of breath, it seems to be more of a diastolic dysfunction.  At this time continue the Lasix and potassium.  I talked to her about adding metolazone only as needed.  I also had a long talk with her and the daughter about the placement of CardioMEMS.  I did give them the papers to read about it.  If they decide to have the CardioMEMS placed, will stop Coumadin for about 5 days and have the procedure done.  Meanwhile continue Lasix and use metolazone as needed    Regarding sleep apnea, she does use CPAP.  Continue to monitor    Regarding the left bundle branch block, at this time it seems to be in regular rhythm.    Regarding advanced directive, talked to her about living will and power of  and gave him booklet regarding that    I will see her back in 4 months or sooner if needed.                Electronically signed by Jamison Judge MD July 22, 2024 13:05 EDT

## 2024-10-28 ENCOUNTER — TELEPHONE (OUTPATIENT)
Dept: CARDIOLOGY | Facility: CLINIC | Age: 79
End: 2024-10-28

## 2024-10-28 NOTE — TELEPHONE ENCOUNTER
Attempted to contact patient, unable to reach, and unable to leave message. Voice mail box is full.

## 2024-10-28 NOTE — TELEPHONE ENCOUNTER
Hub staff attempted to follow warm transfer process and was unsuccessful     Caller: Sheryl Bryan    Relationship to patient: Self    Best call back number: [phone]    Patient is needing: PATIENT RETURNING CALL.

## 2024-10-29 ENCOUNTER — TELEPHONE (OUTPATIENT)
Dept: CARDIOLOGY | Facility: CLINIC | Age: 79
End: 2024-10-29
Payer: MEDICARE

## 2024-10-29 NOTE — TELEPHONE ENCOUNTER
Received fax from Dr. Kulwinder Matthews for cardiac clearance for patient to have a left 5th digit derotation arthroplasty/possible toe surgery. Patient is on aspirin and Warfarin, unclear if needing to hold. Our office does not manage coumadin. According to our records, I do not see where patient has had any stenting. Patient had a mechanical mitral valve replacement on 11/17/23.          Fax [phone]

## 2024-10-29 NOTE — TELEPHONE ENCOUNTER
Cardiac clearance letter sent.     OK to HOLD aspirin and warfarin 5 days prior to surgery. Patient will need Lovenox bridging as she has mechanical valve. Please contact PCP regarding Lovenox as they manage patient's warfarin.

## 2025-03-06 ENCOUNTER — TELEPHONE (OUTPATIENT)
Dept: CARDIOLOGY | Facility: CLINIC | Age: 80
End: 2025-03-06
Payer: MEDICARE

## 2025-03-06 NOTE — TELEPHONE ENCOUNTER
Caller: Sheryl Bryan    Relationship to patient: Self    Best call back number: [phone]    Chief complaint: DIZZY, WEAK, HAD AN APPT BACK IN NOVEMBER SHE HAD CANCELED.     Type of visit: 3-4 MONTH FU, PER LAST APPT NOTE    Requested date: ASAP, AFTERNOON    If rescheduling, when is the original appointment: 11.27.24

## 2025-04-29 ENCOUNTER — OFFICE VISIT (OUTPATIENT)
Dept: CARDIOLOGY | Facility: CLINIC | Age: 80
End: 2025-04-29
Payer: MEDICARE

## 2025-04-29 VITALS
HEART RATE: 69 BPM | HEIGHT: 64 IN | WEIGHT: 145.8 LBS | DIASTOLIC BLOOD PRESSURE: 80 MMHG | OXYGEN SATURATION: 94 % | BODY MASS INDEX: 24.89 KG/M2 | SYSTOLIC BLOOD PRESSURE: 126 MMHG

## 2025-04-29 DIAGNOSIS — I48.0 PAF (PAROXYSMAL ATRIAL FIBRILLATION): Primary | ICD-10-CM

## 2025-04-29 DIAGNOSIS — E78.00 HYPERCHOLESTEREMIA: ICD-10-CM

## 2025-04-29 DIAGNOSIS — I50.32 CHRONIC DIASTOLIC CHF (CONGESTIVE HEART FAILURE), NYHA CLASS 3: ICD-10-CM

## 2025-04-29 DIAGNOSIS — R06.02 SHORTNESS OF BREATH: ICD-10-CM

## 2025-04-29 DIAGNOSIS — I10 PRIMARY HYPERTENSION: ICD-10-CM

## 2025-04-29 RX ORDER — LOSARTAN POTASSIUM 50 MG/1
50 TABLET ORAL DAILY
Qty: 90 TABLET | Refills: 3 | Status: SHIPPED | OUTPATIENT
Start: 2025-04-29

## 2025-04-29 RX ORDER — METOLAZONE 2.5 MG/1
2.5 TABLET ORAL AS NEEDED
Qty: 90 TABLET | Refills: 3 | Status: SHIPPED | OUTPATIENT
Start: 2025-04-29

## 2025-04-29 RX ORDER — ROSUVASTATIN CALCIUM 20 MG/1
20 TABLET, COATED ORAL DAILY
Qty: 90 TABLET | Refills: 3 | Status: SHIPPED | OUTPATIENT
Start: 2025-04-29

## 2025-04-29 RX ORDER — FUROSEMIDE 40 MG/1
40 TABLET ORAL 2 TIMES DAILY
Qty: 180 TABLET | Refills: 3 | Status: SHIPPED | OUTPATIENT
Start: 2025-04-29

## 2025-04-29 RX ORDER — POTASSIUM CHLORIDE 1500 MG/1
20 TABLET, EXTENDED RELEASE ORAL DAILY
Qty: 90 TABLET | Refills: 3 | Status: SHIPPED | OUTPATIENT
Start: 2025-04-29

## 2025-04-29 NOTE — PROGRESS NOTES
Chief Complaint   Patient presents with    no cardiac symptoms     Pt has not had any cardiac issues    Follow-up     Follow up for cardiac management    Labs Only     Labs done with [facility] Will call for results        HPI:  HPI   Sheryl Bryan is a 80 y.o. female with systemic hypertension, dyslipidemia who also has history of significant MR.  She used to have chronic left bundle branch block in the past.  She has undergone mitral valve replacement with a mechanical valve last November.  She has been in the hospital multiple times secondary to heart failure and was treated with diuretics.      She came today for a follow-up visit. Patient denies chest pain, pressure, palpitations, tightness, dizziness, shortness of air. She had labs with PCP not available for my review today. We are requesting results. She has been having allergies from CPAP and is waiting on replacement.     Cardiac History:    Past Surgical History:   Procedure Laterality Date    CARDIAC CATHETERIZATION  10/05/2023    Dr. Kulkarni- 40% LAD. DFR 0.93    CARDIOVASCULAR STRESS TEST  03/31/2015    4 Min, 82% THR. BP-- 180/80. Negative    CARDIOVASCULAR STRESS TEST  09/12/2023    Lexiscan- EF > 70%. 169/87. LBBB. R/O Anterior Ischemia    DISTAL BOWEL RESECTION W/ GASTROSTOMY TUBE INSERTION N/A 11/25/2024    ECHO - CONVERTED  12/23/2014    ([facility] [place] EF 65%. RVSP 48 mmHg    ECHO - CONVERTED  01/14/2020    EF 65%. LA- 4.1 Cm. Mild MR & AI. RVSP- 30 mmHg.    ECHO - CONVERTED  09/12/2023    EF 65%. LA- 4.2. Mod-Severe MR. Mild AI. RVSP- 34 mmHg    ECHO - CONVERTED  10/05/2023    @ [facility] [place]. Dr. Kulkarni- EF 65%. LA- 4.0. Mod-Severe MR. Mild AI. RVSP- 47 mmHg    ECHO - CONVERTED  03/05/2024    TLS. EF 60%. LA- 4.4. MVR.Trace-Mild AI. RVSP- 39 mmHg    ECHO - CONVERTED  07/13/2024    @ [facility] [place]. - EF 65%. LA- 5.5. RVSP- 43 mmHg    GALLBLADDER SURGERY      HERNIA REPAIR Right 11/25/2024    MITRAL VALVE REPLACEMENT  11/17/2023    - # 27 [employer] Mechanical Valve     OTHER SURGICAL HISTORY  04/08/2024    venous reflux.- Mild reflux in (L) [place] Cyst    US CAROTID UNILATERAL  09/12/2023    50-69% Both ICA       Current Outpatient Medications   Medication Sig Dispense Refill    aspirin 81 MG EC tablet Take 1 tablet by mouth Daily.      Cholecalciferol (VITAMIN D) 2000 UNITS tablet Take 1 tablet by mouth Daily.      doxepin (SINEquan) 75 MG capsule Take 1 capsule by mouth Every Night.      furosemide (LASIX) 40 MG tablet Take 1 tablet by mouth 2 (Two) Times a Day. 180 tablet 3    losartan (COZAAR) 50 MG tablet Take 1 tablet by mouth Daily. 90 tablet 3    magnesium oxide (MAG-OX) 400 MG tablet Take 1 tablet by mouth Daily.      metOLazone (ZAROXOLYN) 2.5 MG tablet Take 1 tablet by mouth As Needed (Dyspnea). 90 tablet 3    metoprolol tartrate (LOPRESSOR) 25 MG tablet Take 1 tablet by mouth 2 (Two) Times a Day. 180 tablet 3    montelukast (SINGULAIR) 10 MG tablet Take 1 tablet by mouth Every Night.      multivitamin with minerals tablet tablet Take 1 tablet by mouth Daily.      pantoprazole (PROTONIX) 40 MG EC tablet Take 1 tablet by mouth Daily.      PARoxetine (PAXIL) 20 MG tablet Take 1 tablet by mouth Every Morning.      Plecanatide (TRULANCE PO) Take  by mouth Daily.      potassium chloride (KLOR-CON M20) 20 MEQ CR tablet Take 1 tablet by mouth Daily. 90 tablet 3    Probiotic Product (PROBIOTIC DAILY PO) Take 100 mg by mouth 2 (Two) Times a Day.      rosuvastatin (CRESTOR) 20 MG tablet Take 1 tablet by mouth Daily. 90 tablet 3    warfarin (COUMADIN) 1 MG tablet PCP monitoring       No current facility-administered medications for this visit.       Penicillins    Past Medical History:   Diagnosis Date    GERD (gastroesophageal reflux disease)     H/O colonoscopy     History of appendectomy     Hypertension     Sleep apnea        Social History     Socioeconomic History    Marital status:    Tobacco Use    Smoking status: Former     Current packs/day: 0.00     Types:  "Cigarettes     Quit date:      Years since quittin.3    Smokeless tobacco: Never   Vaping Use    Vaping status: Never Used   Substance and Sexual Activity    Alcohol use: Yes     Comment: socially- occasional    Drug use: No       Family History   Problem Relation Age of Onset    Cancer Mother         leukemia    Aneurysm Father     Stroke Maternal Aunt     Diabetes Maternal Grandmother        Vitals:   /80 (BP Location: Left arm, Patient Position: Sitting, Cuff Size: Adult)   Pulse 69   Ht 162.6 cm (64\")   Wt 66.1 kg (145 lb 12.8 oz)   SpO2 94%   BMI 25.03 kg/m²     Physical Exam  Vitals and nursing note reviewed.   Neck:      Vascular: No carotid bruit.   Cardiovascular:      Rate and Rhythm: Normal rate and regular rhythm.      Pulses: Normal pulses.      Heart sounds: Murmur heard.      No friction rub. No gallop.   Pulmonary:      Effort: Pulmonary effort is normal.      Breath sounds: Normal breath sounds and air entry.   Musculoskeletal:      Right lower leg: No edema.      Left lower leg: No edema.   Skin:     General: Skin is warm and dry.      Capillary Refill: Capillary refill takes less than 2 seconds.   Neurological:      Mental Status: She is alert and oriented to person, place, and time.         ECG 12 Lead    Date/Time: 2025 2:12 PM  Performed by: Shanae Humphries APRN    Authorized by: Shanae Humphries APRN  Comparison: compared with previous ECG from 2024  Similar to previous ECG  Rhythm: sinus rhythm  Rate: normal  BPM: 67    Clinical impression: non-specific ECG  Comments: QTc 414 ms           Assessment & Plan     Diagnoses and all orders for this visit:    1. PAF (paroxysmal atrial fibrillation) (Primary)  -     ECG 12 Lead    2. Chronic diastolic CHF (congestive heart failure), NYHA class 3  -     furosemide (LASIX) 40 MG tablet; Take 1 tablet by mouth 2 (Two) Times a Day.  Dispense: 180 tablet; Refill: 3  -     metOLazone (ZAROXOLYN) 2.5 MG tablet; Take 1 tablet by " mouth As Needed (Dyspnea).  Dispense: 90 tablet; Refill: 3  -     potassium chloride (KLOR-CON M20) 20 MEQ CR tablet; Take 1 tablet by mouth Daily.  Dispense: 90 tablet; Refill: 3    3. Primary hypertension  -     losartan (COZAAR) 50 MG tablet; Take 1 tablet by mouth Daily.  Dispense: 90 tablet; Refill: 3  -     CBC & Differential; Future  -     Comprehensive Metabolic Panel; Future    4. Hypercholesteremia  -     rosuvastatin (CRESTOR) 20 MG tablet; Take 1 tablet by mouth Daily.  Dispense: 90 tablet; Refill: 3  -     Lipid Panel; Future    5. Shortness of breath  -     furosemide (LASIX) 40 MG tablet; Take 1 tablet by mouth 2 (Two) Times a Day.  Dispense: 180 tablet; Refill: 3  -     metOLazone (ZAROXOLYN) 2.5 MG tablet; Take 1 tablet by mouth As Needed (Dyspnea).  Dispense: 90 tablet; Refill: 3  -     potassium chloride (KLOR-CON M20) 20 MEQ CR tablet; Take 1 tablet by mouth Daily.  Dispense: 90 tablet; Refill: 3    PAF  - EKG normal sinus rhythm rate 67 bpm normal QTc and TN intervals.  - Continue Lopressor, warfarin    CHF  - Echocardiogram 11/2024 EF 70%, PAP 44 mmHg moderate right ventricle enlargement  - Continue Lasix, Zaroxolyn, potassium, losartan, Metoprolol    Hypertension  - BP controlled  - Continue 6, Cozaar, Lopressor    Hypercholesteremia  - Lipid panel ordered  - Continue statin therapy    Stable from a cardiac standpoint. No further testing recommended at this time. No medication changes made today. Refills sent.  Ordered labs as I have no recent lipid panel    Visit Diagnoses:    ICD-10-CM ICD-9-CM   1. PAF (paroxysmal atrial fibrillation)  I48.0 427.31   2. Chronic diastolic CHF (congestive heart failure), NYHA class 3  I50.32 428.32   3. Primary hypertension  I10 401.9   4. Hypercholesteremia  E78.00 272.0   5. Shortness of breath  R06.02 786.05       Meds Ordered During Visit:  New Medications Ordered This Visit   Medications    furosemide (LASIX) 40 MG tablet     Sig: Take 1 tablet by mouth  2 (Two) Times a Day.     Dispense:  180 tablet     Refill:  3    losartan (COZAAR) 50 MG tablet     Sig: Take 1 tablet by mouth Daily.     Dispense:  90 tablet     Refill:  3    metOLazone (ZAROXOLYN) 2.5 MG tablet     Sig: Take 1 tablet by mouth As Needed (Dyspnea).     Dispense:  90 tablet     Refill:  3    potassium chloride (KLOR-CON M20) 20 MEQ CR tablet     Sig: Take 1 tablet by mouth Daily.     Dispense:  90 tablet     Refill:  3    rosuvastatin (CRESTOR) 20 MG tablet     Sig: Take 1 tablet by mouth Daily.     Dispense:  90 tablet     Refill:  3       Follow Up Appointment:   Return in about 6 months (around 10/29/2025), or if symptoms worsen or fail to improve.           This document has been electronically signed by CHRISS Serna  May 1, 2025 16:42 EDT    Dictated Utilizing Dragon Dictation: Part of this note may be an electronic transcription/translation of spoken language to printed text using the Dragon Dictation System.

## 2025-06-30 ENCOUNTER — LAB (OUTPATIENT)
Dept: LAB | Facility: HOSPITAL | Age: 80
End: 2025-06-30
Payer: MEDICARE

## 2025-06-30 PROCEDURE — 80061 LIPID PANEL: CPT | Performed by: NURSE PRACTITIONER

## 2025-06-30 PROCEDURE — 80053 COMPREHEN METABOLIC PANEL: CPT | Performed by: NURSE PRACTITIONER

## 2025-06-30 PROCEDURE — 85025 COMPLETE CBC W/AUTO DIFF WBC: CPT | Performed by: NURSE PRACTITIONER
